# Patient Record
Sex: FEMALE | Race: WHITE | Employment: OTHER | URBAN - METROPOLITAN AREA
[De-identification: names, ages, dates, MRNs, and addresses within clinical notes are randomized per-mention and may not be internally consistent; named-entity substitution may affect disease eponyms.]

---

## 2019-10-31 ENCOUNTER — HOSPITAL ENCOUNTER (OUTPATIENT)
Dept: WOUND CARE | Age: 79
Discharge: HOME | End: 2019-10-31
Attending: SURGERY
Payer: MEDICARE

## 2019-10-31 VITALS
HEART RATE: 50 BPM | RESPIRATION RATE: 18 BRPM | BODY MASS INDEX: 36.8 KG/M2 | TEMPERATURE: 98.5 F | OXYGEN SATURATION: 99 % | DIASTOLIC BLOOD PRESSURE: 65 MMHG | WEIGHT: 200 LBS | HEIGHT: 62 IN | SYSTOLIC BLOOD PRESSURE: 166 MMHG

## 2019-10-31 DIAGNOSIS — E11.621 DIABETIC ULCER OF TOE OF RIGHT FOOT ASSOCIATED WITH TYPE 2 DIABETES MELLITUS, LIMITED TO BREAKDOWN OF SKIN (HCC): Primary | ICD-10-CM

## 2019-10-31 DIAGNOSIS — L97.511 DIABETIC ULCER OF TOE OF RIGHT FOOT ASSOCIATED WITH TYPE 2 DIABETES MELLITUS, LIMITED TO BREAKDOWN OF SKIN (HCC): Primary | ICD-10-CM

## 2019-10-31 PROCEDURE — 99213 OFFICE O/P EST LOW 20 MIN: CPT

## 2019-10-31 RX ORDER — INSULIN ASPART 100 [IU]/ML
7 INJECTION, SOLUTION INTRAVENOUS; SUBCUTANEOUS
COMMUNITY
End: 2020-02-18

## 2019-10-31 RX ORDER — DICLOFENAC SODIUM 10 MG/G
2 GEL TOPICAL 4 TIMES DAILY
COMMUNITY

## 2019-10-31 RX ORDER — NYSTATIN 100000 U/G
OINTMENT TOPICAL 2 TIMES DAILY
COMMUNITY
End: 2019-11-12

## 2019-10-31 RX ORDER — CLONAZEPAM 1 MG/1
TABLET ORAL DAILY
COMMUNITY

## 2019-10-31 RX ORDER — CARVEDILOL 25 MG/1
25 TABLET ORAL 2 TIMES DAILY WITH MEALS
COMMUNITY
End: 2019-11-12

## 2019-10-31 RX ORDER — FLUOXETINE HYDROCHLORIDE 20 MG/1
20 CAPSULE ORAL DAILY
COMMUNITY

## 2019-10-31 RX ORDER — TRAMADOL HYDROCHLORIDE 50 MG/1
50 TABLET ORAL
COMMUNITY

## 2019-10-31 RX ORDER — SOLIFENACIN SUCCINATE 10 MG/1
10 TABLET, FILM COATED ORAL DAILY
COMMUNITY

## 2019-10-31 RX ORDER — METFORMIN HYDROCHLORIDE 500 MG/1
500 TABLET ORAL 2 TIMES DAILY WITH MEALS
COMMUNITY
End: 2019-11-12

## 2019-10-31 RX ORDER — MEMANTINE HYDROCHLORIDE 14 MG/1
14 CAPSULE, EXTENDED RELEASE ORAL DAILY
COMMUNITY

## 2019-10-31 RX ORDER — ESOMEPRAZOLE MAGNESIUM 40 MG/1
40 CAPSULE, DELAYED RELEASE ORAL DAILY
COMMUNITY

## 2019-10-31 RX ORDER — FUROSEMIDE 40 MG/1
40 TABLET ORAL 2 TIMES DAILY
COMMUNITY

## 2019-10-31 RX ORDER — ACETAMINOPHEN 500 MG
2000 TABLET ORAL 2 TIMES DAILY
COMMUNITY
End: 2019-11-12

## 2019-10-31 RX ORDER — ASPIRIN 81 MG/1
81 TABLET ORAL DAILY
COMMUNITY

## 2019-10-31 RX ORDER — DOCUSATE SODIUM 100 MG/1
100 CAPSULE, LIQUID FILLED ORAL 2 TIMES DAILY
COMMUNITY
End: 2019-11-12

## 2019-10-31 RX ORDER — MELATONIN 5 MG
5 CAPSULE ORAL
COMMUNITY
End: 2019-11-12

## 2019-10-31 RX ORDER — POTASSIUM CHLORIDE 750 MG/1
10 TABLET, FILM COATED, EXTENDED RELEASE ORAL
COMMUNITY
End: 2019-11-12

## 2019-10-31 RX ORDER — CLOPIDOGREL BISULFATE 75 MG/1
75 TABLET ORAL DAILY
COMMUNITY

## 2019-10-31 RX ORDER — POLYETHYLENE GLYCOL 3350 17 G/17G
17 POWDER, FOR SOLUTION ORAL DAILY
COMMUNITY
End: 2019-11-12

## 2019-10-31 RX ORDER — ATORVASTATIN CALCIUM 40 MG/1
40 TABLET, FILM COATED ORAL DAILY
COMMUNITY

## 2019-10-31 RX ORDER — LISINOPRIL 5 MG/1
5 TABLET ORAL DAILY
COMMUNITY

## 2019-10-31 RX ORDER — OLANZAPINE 10 MG/1
10 TABLET ORAL
COMMUNITY

## 2019-10-31 RX ORDER — DONEPEZIL HYDROCHLORIDE 5 MG/1
5 TABLET, FILM COATED ORAL
COMMUNITY

## 2019-10-31 RX ORDER — LEVOTHYROXINE SODIUM 100 UG/1
125 TABLET ORAL
COMMUNITY

## 2019-10-31 RX ORDER — VENLAFAXINE 75 MG/1
75 TABLET ORAL 3 TIMES DAILY
COMMUNITY
End: 2020-02-18

## 2019-10-31 RX ORDER — ACETAMINOPHEN 500 MG
500 TABLET ORAL
COMMUNITY
End: 2019-11-12

## 2019-10-31 RX ORDER — LANOLIN ALCOHOL/MO/W.PET/CERES
400 CREAM (GRAM) TOPICAL DAILY
COMMUNITY

## 2019-10-31 RX ORDER — INSULIN GLARGINE 100 [IU]/ML
55 INJECTION, SOLUTION SUBCUTANEOUS
COMMUNITY

## 2019-10-31 NOTE — WOUND CARE
Clinic Level of Care Assessment NAME:  Riky Bradley YOB: 1940 GENDER: female MEDICAL RECORD NUMBER:  [id number] DATE:  10/31/2019 Wound Count Document in [facility] Number of Wounds Assessed Points No Wounds/Ulcers []   0 Less than Three Wounds/Ulcers [x]   1  
3-6 Wounds/Ulcers []   2 Greater than 6 Wounds/Ulcers []   3 Ambulation Status Document in Coord/HIGINIO/Mobility tab Status Definition Points Independent Independently able to ambulate. Fully able (without any assistance) to get on/off exam table/chair. []   0 Minimal Physical Assistance Requires physical assistance of one person to ambulate and/or position patient to be examined. Includes necessary physical assistance to position lower extremities on/off stool. [x]   1 Moderate Physical Assistance Requires at least one staff member to physically assist patient in ambulating into treatment room, and on/off exam table. []   2 Full Assistance Requires assistance of at least two staff members to transfer patient into treatment room and/or on/off exam table/chair. \"Total Transfer\". []   3 Dressing Complexity Document in [facility] and Write Appropriate Order Complexity Definition Points No Dressing  []   0 Simple Minimal, simple dressing. i.e. Band-aid, gauze, simple wrap. [x]   1 Intermediate Moderately complicated requiring licensed personnel to apply i.e. collagen matrix, ointments, gels, alginates. []   2 Complex Complicated requiring licensed personnel to apply dressings 6 or more wounds. []   3 Teaching Effort Document in Education Tab Effort Definition Points No Teaching  []   0 Simple Reinforce two or less topics. Document in Education navigator.  []   1 Intermediate Reinforce three to five topics and/or one additional  
new topic. Document in Education navigator. [x]   2 Complex Teach more than one new topic. New patient information  
packet reviewed and/or reinforce more than three topics. Document in Education navigator. HBO initial instruction. []   3 Patient Assessment and Planning Planning Definition Points Simple Multiple System Simple: Simple follow-up with routine assessment and planning. If Discharged, instructions and long term/follow-up care given to patient/caregiver. Discharged, instructions and/or After Visit Summary given to patient/caregiver and instructions completed. []   1 Intermediate Multiple System Intermediate: Contact with outside resources; i.e. Telephone calls to home health, [facility]. May include filling out forms and writing letters, arranging transportation, communication with insurance , vendors, etc.  Discharged, instructions and/or After Visit Summary given to patient/caregiver and instructions completed. [x]   2 Complex Multiple System Complex: Full, comprehensive assessment and planning. Follow the entire navigator under Wound Visit charting filling out each tab which includes OP Adm Database Screening, Education and CarePlan  HBO risk assessment completed. Discharged, instructions and/or After Visit Summary given to patient/caregiver and instructions completed. []   3 Is this the Patient's First Visit to the [address] [place] [address] Road Yes Is this Patient Established @ [facility] 
No 
 
         Clinical Level of Care Points  0-2  Level 1 [] Points  3-5  Level 2 [] Points  6-9  Level 3 [x] Points  10-12  Level 4 [] Points  13-15  Level 5 [] Electronically signed by Rupal Lloyd RN on 10/31/2019 at 2:53 PM

## 2019-10-31 NOTE — DISCHARGE INSTRUCTIONS
Right Great Toe Wound:  Cleanse wound with normal saline. DO NOT GET WET IN SHOWER, WEAR CAST COVER IN SHOWER! Apply Betadine to great toe, cover with rolled gauze. Change daily. Right Heel Wound:  Cleanse wound with normal saline. DO NOT GET WET IN SHOWER, WEAR CAST COVER IN SHOWER! Apply Betadine to right heel, cover with rolled gauze. Change daily. Elevate heels off of bed, do not allow pressure to be put on heels. *Hemoglobin A1C lab ordered to be done at facility, please bring result to next appointment. *Xray of Right Foot to be done in facility and results brought to next appointment. *NIA study of blood flow ordered to be done by Vascular at [facility], Vascular office will call to schedule appointment. *Please order offloading heelift boots, wear while sitting laying down. DO NOT WALK IN THEM!

## 2019-10-31 NOTE — WOUND CARE
[address] [place] [address], [address] [address] Phone: [phone] Fax: [phone] Patient: Merissa Johnson MRN: [id number]  SSN: [id number] YOB: 1940  Age: 67 y.o. Sex: female Return Appointment: 1 week with Latia Almodovar MD 
 
Instructions:  
Right Great Toe Wound: 
Cleanse wound with normal saline. DO NOT GET WET IN SHOWER, WEAR CAST COVER IN SHOWER! Apply Betadine to great toe, cover with rolled gauze. Change daily. Right Heel Wound: 
Cleanse wound with normal saline. DO NOT GET WET IN SHOWER, WEAR CAST COVER IN SHOWER! Apply Betadine to right heel, cover with rolled gauze. Change daily. Elevate heels off of bed, do not allow pressure to be put on heels. *Hemoglobin A1C lab ordered to be done at facility, please bring result to next appointment. *Xray of Right Foot to be done in facility and results brought to next appointment. *NIA study of blood flow ordered to be done by Vascular at [facility], Vascular office will call to schedule appointment. *Please order offloading heelift boots, wear while sitting laying down. DO NOT WALK IN THEM! Should you experience increased redness, swelling, pain, foul odor, size of wound(s), or have a temperature over 101 degrees please contact the [address] [place] [address] Road at [phone] or if after hours contact your primary care physician or go to the hospital emergency department. Signed By: Susi Acosta RN October 31, 2019

## 2019-10-31 NOTE — WOUND CARE
10/31/19 1434 Wound Toe (Comment  which one) Right Date First Assessed/Time First Assessed: 10/31/19 1433   Present on Hospital Admission: Yes  Primary Wound Type: Diabetic Ulcer  Location: (c) Toe (Comment  which one)  Wound Location Orientation: Right Dressing Status Old drainage Dressing Type  
(bordered foam) Non-staged Wound Description Full thickness Wound Length (cm) 0.7 cm Wound Width (cm) 1.5 cm Wound Depth (cm) 0.1 cm Wound Surface Area (cm^2) 1.05 cm^2 Wound Volume (cm^3) 0.1 cm^3 Condition of Base Eschar Tissue Type Percent Black 100 % Drainage Amount Small Drainage Color Serosanguinous Wound Odor None Carmencita-wound Assessment Dry Cleansing and Cleansing Agents  Normal saline Dressing Changed Changed/New Wound Heel Right Date First Assessed/Time First Assessed: 10/31/19 1434   Present on Hospital Admission: Yes  Primary Wound Type: Diabetic Ulcer  Location: Heel  Wound Location Orientation: Right Dressing Status Old drainage Dressing Type  
(medi-honey, duoderm, rolled gauze) Non-staged Wound Description Full thickness Wound Length (cm) 4 cm Wound Width (cm) 3 cm Wound Depth (cm) 0.1 cm Wound Surface Area (cm^2) 12 cm^2 Wound Volume (cm^3) 1.2 cm^3 Condition of Base Eschar Tissue Type Percent Black 100 % Drainage Amount Small Drainage Color Serosanguinous Cleansing and Cleansing Agents  Normal saline Dressing Changed Changed/New Patient Is taking Plavix and Aspirin daily.

## 2019-11-12 ENCOUNTER — HOSPITAL ENCOUNTER (OUTPATIENT)
Dept: WOUND CARE | Age: 79
Discharge: HOME | End: 2019-11-12
Attending: SURGERY
Payer: MEDICARE

## 2019-11-12 VITALS
DIASTOLIC BLOOD PRESSURE: 65 MMHG | OXYGEN SATURATION: 98 % | TEMPERATURE: 98.3 F | RESPIRATION RATE: 18 BRPM | SYSTOLIC BLOOD PRESSURE: 168 MMHG | HEART RATE: 50 BPM

## 2019-11-12 PROCEDURE — 99213 OFFICE O/P EST LOW 20 MIN: CPT

## 2019-11-12 NOTE — PROGRESS NOTES
Patient returns today and has gotten good healing of her toe and partial healing of the heel this is debrided slightly in the day and is redressed with Betadine she will return in 3 weeks. Wound Center Progress Note    Patient: Zohra Ann MRN: [id number]  SSN: [id number]    YOB: 1940  Age: 67 y.o. Sex: female      Subjective:     Chief Complaint:  Zohra Ann is a 67 y.o. WHITE OR  female who presents with toes right, great toe, R heel wound of 3 weeks duration. History of Present Illness:     See above note  Wound Caused By: chronic pressure/irritation due to bad position  Associated Signs and Symptoms: None  Timing: Intermittent  Quality: Cramping  Severity: 2/10  Modifying Factors: Inactivity  Current Wound Care: See nurse's notes  Past Medical History:   Diagnosis Date    Arthritis     [facility]     Chronic pain     [facility]     Diabetes ([facility]     GERD (gastroesophageal reflux disease)     Hypertension     Obstructive sleep apnea     Pulmonary hypertension ([facility]     Stroke ([facility]     Thyroid disease     Hypothyroidism      Past Surgical History:   Procedure Laterality Date    HX BILATERAL SALPINGO-OOPHORECTOMY      HX CHOLECYSTECTOMY      HX GYN      total abdominal hysterectomy     History reviewed. No pertinent family history. Social History     Tobacco Use    Smoking status: Former Smoker     Packs/day: 2.00     Years: 4.00     Pack years: 8.00     Last attempt to quit:      Years since quittin.8    Smokeless tobacco: Never Used   Substance Use Topics    Alcohol use: Not Currently       Prior to Admission medications    Medication Sig Start Date End Date Taking? Authorizing Provider   esomeprazole (NEXIUM) 40 mg capsule Take 40 mg by mouth daily. Provider, Historical   clopidogrel (PLAVIX) 75 mg tab Take 75 mg by mouth daily.     Provider, Historical   furosemide (LASIX) 40 mg tablet Take 40 mg by mouth daily. Provider, Historical   clonazePAM (KLONOPIN) 1 mg tablet Take 0.5 mg by mouth two (2) times a day. Provider, Historical   levothyroxine (SYNTHROID) 100 mcg tablet Take 100 mcg by mouth Daily (before breakfast). Provider, Historical   venlafaxine (EFFEXOR) 75 mg tablet Take 75 mg by mouth three (3) times daily. Provider, Historical   traMADol (ULTRAM) 50 mg tablet Take 50 mg by mouth every six (6) hours as needed for Pain. Provider, Historical   aspirin delayed-release 81 mg tablet Take 81 mg by mouth daily. Provider, Historical   diclofenac (VOLTAREN) 1 % gel Apply 2 g to affected area four (4) times daily. Provider, Historical   donepezil (ARICEPT) 5 mg tablet Take 5 mg by mouth nightly. Provider, Historical   FLUoxetine (PROZAC) 20 mg capsule Take 20 mg by mouth daily. Provider, Historical   magnesium oxide (MAG-OX) 400 mg tablet Take 400 mg by mouth daily. Provider, Historical   insulin aspart U-100 (NOVOLOG) 100 unit/mL injection 7 Units by SubCUTAneous route Before breakfast, lunch, and dinner. Provider, Historical   OLANZapine (ZYPREXA) 10 mg tablet Take 10 mg by mouth nightly. Provider, Historical   solifenacin (VESICARE) 10 mg tablet Take 10 mg by mouth daily. Provider, Historical   atorvastatin (LIPITOR) 40 mg tablet Take 40 mg by mouth daily. Provider, Historical   Ferrous Fumarate 325 mg (106 mg iron) tab Take 325 mg by mouth. Provider, Historical   insulin glargine (LANTUS U-100 INSULIN) 100 unit/mL injection 50 Units by SubCUTAneous route nightly. Provider, Historical   lisinopril (PRINIVIL, ZESTRIL) 5 mg tablet Take 5 mg by mouth daily. Provider, Historical   memantine ER (NAMENDA XR) 14 mg capsule Take 14 mg by mouth daily. Provider, Historical   insulin NPH/insulin regular (NOVOLIN 70/30, HUMULIN 70/30) 100 unit/mL (70-30) injection 75 Units by SubCUTAneous route two (2) times a day.     Provider, Historical     Allergies Allergen Reactions    Propoxyphene N-Acetaminophen Hives    Sulfa (Sulfonamide Antibiotics) Hives        Review of Systems:  A comprehensive review of systems was negative except for that written in the History of Present Illness. No results found for: HBA1C, LPH2HVUG, HGBE8, ZUW7PRFX, OHW1IAQA       There is no immunization history on file for this patient. There is no height or weight on file to calculate BMI. Counseling regarding nutrition done: No     Current medications:  Current Outpatient Medications   Medication Sig Dispense Refill    esomeprazole (NEXIUM) 40 mg capsule Take 40 mg by mouth daily.  clopidogrel (PLAVIX) 75 mg tab Take 75 mg by mouth daily.  furosemide (LASIX) 40 mg tablet Take 40 mg by mouth daily.  clonazePAM (KLONOPIN) 1 mg tablet Take 0.5 mg by mouth two (2) times a day.  levothyroxine (SYNTHROID) 100 mcg tablet Take 100 mcg by mouth Daily (before breakfast).  venlafaxine (EFFEXOR) 75 mg tablet Take 75 mg by mouth three (3) times daily.  traMADol (ULTRAM) 50 mg tablet Take 50 mg by mouth every six (6) hours as needed for Pain.  aspirin delayed-release 81 mg tablet Take 81 mg by mouth daily.  diclofenac (VOLTAREN) 1 % gel Apply 2 g to affected area four (4) times daily.  donepezil (ARICEPT) 5 mg tablet Take 5 mg by mouth nightly.  FLUoxetine (PROZAC) 20 mg capsule Take 20 mg by mouth daily.  magnesium oxide (MAG-OX) 400 mg tablet Take 400 mg by mouth daily.  insulin aspart U-100 (NOVOLOG) 100 unit/mL injection 7 Units by SubCUTAneous route Before breakfast, lunch, and dinner.  OLANZapine (ZYPREXA) 10 mg tablet Take 10 mg by mouth nightly.  solifenacin (VESICARE) 10 mg tablet Take 10 mg by mouth daily.  atorvastatin (LIPITOR) 40 mg tablet Take 40 mg by mouth daily.  Ferrous Fumarate 325 mg (106 mg iron) tab Take 325 mg by mouth.       insulin glargine (LANTUS U-100 INSULIN) 100 unit/mL injection 50 Units by SubCUTAneous route nightly.  lisinopril (PRINIVIL, ZESTRIL) 5 mg tablet Take 5 mg by mouth daily.  memantine ER (NAMENDA XR) 14 mg capsule Take 14 mg by mouth daily.  insulin NPH/insulin regular (NOVOLIN 70/30, HUMULIN 70/30) 100 unit/mL (70-30) injection 75 Units by SubCUTAneous route two (2) times a day. Objective:     Physical Exam:     Visit Vitals  /65 (BP 1 Location: Right arm)   Pulse (!) 50   Temp 98.3 °F (36.8 °C)   Resp 18   SpO2 98%       General: well developed, well nourished, pleasant , NAD. Hygiene good  Psych: cooperative. Pleasant. No anxiety or depression. Normal mood and affect. Neuro: alert and oriented to person/place/situation. Otherwise nonfocal.  Derm: Normal turgor for age, dry skin  HEENT: Normocephalic, atraumatic. EOMI. Conjunctiva clear. No scleral icterus. Neck: Normal range of motion. No masses. Chest: Good air entry bilaterally. Respirations nonlabored  Cardio: Normal heart sounds,no rubs, murmurs or gallops  Abdomen: Soft, nontender, nondistended, normoactive bowel sounds  Lower extremities: color normal; temperature normal. Hair growth is not present. Calves are supple, nontender, approximately equally sized in comparison.  Capillary refill <3 sec            Ulcer Description:   Wound Toe (Comment  which one) Right (Active)   Dressing Status Clean 11/12/2019  1:20 PM   Non-staged Wound Description Full thickness 10/31/2019  2:34 PM   Wound Length (cm) 0 cm 11/12/2019  1:20 PM   Wound Width (cm) 0 cm 11/12/2019  1:20 PM   Wound Depth (cm) 0 cm 11/12/2019  1:20 PM   Wound Surface Area (cm^2) 0 cm^2 11/12/2019  1:20 PM   Wound Volume (cm^3) 0 cm^3 11/12/2019  1:20 PM   Condition of Base Epithelializing 11/12/2019  1:20 PM   Epithelialization (%) 100 11/12/2019  1:20 PM   Tissue Type Percent Black 100 % 10/31/2019  2:34 PM   Drainage Amount None 11/12/2019  1:20 PM   Drainage Color Serosanguinous 10/31/2019  2:34 PM   Wound Odor None 11/12/2019 1:20 PM   Carmencita-wound Assessment Dry 10/31/2019  2:34 PM   Cleansing and Cleansing Agents  Normal saline 11/12/2019  1:20 PM   Dressing Changed Changed/New 10/31/2019  2:34 PM   Number of days: 12       Wound Heel Right (Active)   Dressing Status Clean, dry, and intact 11/12/2019  1:20 PM   Non-staged Wound Description Full thickness 11/12/2019  1:20 PM   Wound Length (cm) 3.5 cm 11/12/2019  1:20 PM   Wound Width (cm) 3 cm 11/12/2019  1:20 PM   Wound Depth (cm) 0.1 cm 11/12/2019  1:20 PM   Wound Surface Area (cm^2) 10.5 cm^2 11/12/2019  1:20 PM   Wound Volume (cm^3) 1.05 cm^3 11/12/2019  1:20 PM   Condition of Base Eschar 11/12/2019  1:20 PM   Tissue Type Percent Black 100 % 11/12/2019  1:20 PM   Drainage Amount None 11/12/2019  1:20 PM   Drainage Color Serosanguinous 10/31/2019  2:34 PM   Wound Odor None 11/12/2019  1:20 PM   Cleansing and Cleansing Agents  Normal saline 11/12/2019  1:20 PM   Dressing Changed Changed/New 11/12/2019  1:20 PM   Number of days: 12         Data Review:   No results found for this or any previous visit (from the past 24 hour(s)). Assessment:     67 y.o. female with R heel combined ulcer. Plan:     Orders Placed This Encounter    WOUND CARE, DRESSING CHANGE        Right Heel Wound:  Cleanse wound with normal saline. DO NOT GET WET IN SHOWER, WEAR CAST COVER IN SHOWER! Apply Betadine to right heel, cover with rolled gauze. Change daily. Elevate heels off of bed, do not allow pressure to be put on heels.      *Hemoglobin A1C lab ordered to be done at facility, please bring result to next appointment. *Xray of Right Foot to be done in facility and results brought to next appointment. *Please order offloading heelift boots, wear while sitting laying down. DO NOT WALK IN THEM! Standing Status:   Standing     Number of Occurrences:   1        Patient understood and agrees with plan. Questions answered.     Follow-up Information     Follow up With Specialties Details Why Contact Info    [address] In 3 weeks  [address] [place] [address] [id number]  [phone]             Any procedures done today in the [address] are documented in a separate note in [facility] and made part of this record by reference.      Dictated using voice recognition software; proofread, but unrecognized errors may exist.    Signed By: Delphine Irving MD     November 12, 2019

## 2019-11-12 NOTE — WOUND CARE
[address] [place] [address], [address] Phone: [phone] Fax: [phone] Patient: Pankaj Leal MRN: [id number]  SSN: [id number] YOB: 1940  Age: 67 y.o. Sex: female Return Appointment: 3 weeks with Yusra Carballo MD 
 
Instructions:   
Right Heel Wound: 
Cleanse wound with normal saline. DO NOT GET WET IN SHOWER, WEAR CAST COVER IN SHOWER! Apply Betadine to right heel, cover with rolled gauze. Change daily. Elevate heels off of bed, do not allow pressure to be put on heels.  
  
*Hemoglobin A1C lab ordered to be done at facility, please bring result to next appointment. *Xray of Right Foot to be done in facility and results brought to next appointment. *Please order offloading heelift boots, wear while sitting laying down. DO NOT WALK IN THEM! Should you experience increased redness, swelling, pain, foul odor, size of wound(s), or have a temperature over 101 degrees please contact the [address] [place] [address] Road at [phone] or if after hours contact your primary care physician or go to the hospital emergency department. Signed By: Kota Banuelos RN November 12, 2019

## 2019-11-12 NOTE — WOUND CARE
11/12/19 1320 Wound Toe (Comment  which one) Right Date First Assessed/Time First Assessed: 10/31/19 1433   Present on Hospital Admission: Yes  Primary Wound Type: Diabetic Ulcer  Location: (c) Toe (Comment  which one)  Wound Location Orientation: Right Dressing Status Clean Dressing Type  
(betadine) Wound Length (cm) 0 cm Wound Width (cm) 0 cm Wound Depth (cm) 0 cm Wound Surface Area (cm^2) 0 cm^2 Wound Volume (cm^3) 0 cm^3 Condition of Base Epithelializing Epithelialization (%) 100 Drainage Amount None Wound Odor None Cleansing and Cleansing Agents  Normal saline Wound Heel Right Date First Assessed/Time First Assessed: 10/31/19 1434   Present on Hospital Admission: Yes  Primary Wound Type: Diabetic Ulcer  Location: Heel  Wound Location Orientation: Right Dressing Status Clean, dry, and intact Dressing Type  
(betadine, rolled gauze) Wound Length (cm) 3.5 cm Wound Width (cm) 3 cm Wound Depth (cm) 0.1 cm Wound Surface Area (cm^2) 10.5 cm^2 Wound Volume (cm^3) 1.05 cm^3 Condition of Base Eschar Tissue Type Percent Black 100 % Drainage Amount None Wound Odor None Cleansing and Cleansing Agents  Normal saline Patient is taking Plavix and Aspirin daily

## 2019-11-12 NOTE — WOUND CARE
Clinic Level of Care Assessment NAME:  Soledad Mooney YOB: 1940 GENDER: female MEDICAL RECORD NUMBER:  [id number] DATE:  11/12/2019 Wound Count Document in [facility] Number of Wounds Assessed Points No Wounds/Ulcers []   0 Less than Three Wounds/Ulcers [x]   1  
3-6 Wounds/Ulcers []   2 Greater than 6 Wounds/Ulcers []   3 Ambulation Status Document in Coord/HIGINIO/Mobility tab Status Definition Points Independent Independently able to ambulate. Fully able (without any assistance) to get on/off exam table/chair. []   0 Minimal Physical Assistance Requires physical assistance of one person to ambulate and/or position patient to be examined. Includes necessary physical assistance to position lower extremities on/off stool. []   1 Moderate Physical Assistance Requires at least one staff member to physically assist patient in ambulating into treatment room, and on/off exam table. [x]   2 Full Assistance Requires assistance of at least two staff members to transfer patient into treatment room and/or on/off exam table/chair. \"Total Transfer\". []   3 Dressing Complexity Document in [facility] and Write Appropriate Order Complexity Definition Points No Dressing  []   0 Simple Minimal, simple dressing. i.e. Band-aid, gauze, simple wrap. [x]   1 Intermediate Moderately complicated requiring licensed personnel to apply i.e. collagen matrix, ointments, gels, alginates. []   2 Complex Complicated requiring licensed personnel to apply dressings 6 or more wounds. []   3 Teaching Effort Document in Education Tab Effort Definition Points No Teaching  []   0 Simple Reinforce two or less topics. Document in Education navigator.  []   1 Intermediate Reinforce three to five topics and/or one additional  
new topic. Document in Education navigator. [x]   2 Complex Teach more than one new topic. New patient information  
packet reviewed and/or reinforce more than three topics. Document in Education navigator. HBO initial instruction. []   3 Patient Assessment and Planning Planning Definition Points Simple Multiple System Simple: Simple follow-up with routine assessment and planning. If Discharged, instructions and long term/follow-up care given to patient/caregiver. Discharged, instructions and/or After Visit Summary given to patient/caregiver and instructions completed. []   1 Intermediate Multiple System Intermediate: Contact with outside resources; i.e. Telephone calls to home health, [facility]. May include filling out forms and writing letters, arranging transportation, communication with insurance , vendors, etc.  Discharged, instructions and/or After Visit Summary given to patient/caregiver and instructions completed. [x]   2 Complex Multiple System Complex: Full, comprehensive assessment and planning. Follow the entire navigator under Wound Visit charting filling out each tab which includes OP Adm Database Screening, Education and CarePlan  HBO risk assessment completed. Discharged, instructions and/or After Visit Summary given to patient/caregiver and instructions completed. []   3 Is this the Patient's First Visit to the [address] [place] [address] Road No 
 
 
Is this Patient Established @ [facility] 
Yes Clinical Level of Care Points  0-2  Level 1 [] Points  3-5  Level 2 [] Points  6-9  Level 3 [x] Points  10-12  Level 4 [] Points  13-15  Level 5 [] Electronically signed by Ashwin Raymond RN on 11/12/2019 at 3:41 PM

## 2019-12-03 ENCOUNTER — HOSPITAL ENCOUNTER (OUTPATIENT)
Dept: WOUND CARE | Age: 79
Discharge: HOME | End: 2019-12-03
Attending: SURGERY
Payer: MEDICARE

## 2019-12-03 VITALS — RESPIRATION RATE: 18 BRPM | DIASTOLIC BLOOD PRESSURE: 72 MMHG | SYSTOLIC BLOOD PRESSURE: 165 MMHG | TEMPERATURE: 98.2 F

## 2019-12-03 PROCEDURE — 99213 OFFICE O/P EST LOW 20 MIN: CPT

## 2019-12-03 NOTE — WOUND CARE
12/03/19 1318 Wound Heel Right Date First Assessed/Time First Assessed: 10/31/19 1434   Present on Hospital Admission: Yes  Primary Wound Type: Diabetic Ulcer  Location: Heel  Wound Location Orientation: Right Dressing Status Clean, dry, and intact Dressing Type  
(betadine, abd,rolled gauze) Non-staged Wound Description Full thickness Wound Length (cm) 3.5 cm Wound Width (cm) 1.8 cm Wound Depth (cm) 0.1 cm Wound Surface Area (cm^2) 6.3 cm^2 Wound Volume (cm^3) 0.63 cm^3 Condition of Base Eschar Tissue Type Percent Black 100 % Drainage Amount None Wound Odor None Cleansing and Cleansing Agents  Normal saline Patient is taking Aspirin and Plavix daily

## 2019-12-03 NOTE — WOUND CARE
Clinic Level of Care Assessment NAME:  Soledad Mooney YOB: 1940 GENDER: female MEDICAL RECORD NUMBER:  [id number] DATE:  12/3/2019 Wound Count Document in [facility] Number of Wounds Assessed Points No Wounds/Ulcers []   0 Less than Three Wounds/Ulcers [x]   1  
3-6 Wounds/Ulcers []   2 Greater than 6 Wounds/Ulcers []   3 Ambulation Status Document in Coord/HIGINIO/Mobility tab Status Definition Points Independent Independently able to ambulate. Fully able (without any assistance) to get on/off exam table/chair. []   0 Minimal Physical Assistance Requires physical assistance of one person to ambulate and/or position patient to be examined. Includes necessary physical assistance to position lower extremities on/off stool. []   1 Moderate Physical Assistance Requires at least one staff member to physically assist patient in ambulating into treatment room, and on/off exam table. [x]   2 Full Assistance Requires assistance of at least two staff members to transfer patient into treatment room and/or on/off exam table/chair. \"Total Transfer\". []   3 Dressing Complexity Document in [facility] and Write Appropriate Order Complexity Definition Points No Dressing  []   0 Simple Minimal, simple dressing. i.e. Band-aid, gauze, simple wrap. []   1 Intermediate Moderately complicated requiring licensed personnel to apply i.e. collagen matrix, ointments, gels, alginates. [x]   2 Complex Complicated requiring licensed personnel to apply dressings 6 or more wounds. []   3 Teaching Effort Document in Education Tab Effort Definition Points No Teaching  []   0 Simple Reinforce two or less topics. Document in Education navigator. [x]   1 Intermediate Reinforce three to five topics and/or one additional  
new topic. Document in Education navigator. []   2 Complex Teach more than one new topic. New patient information  
packet reviewed and/or reinforce more than three topics. Document in Education navigator. HBO initial instruction. []   3 Patient Assessment and Planning Planning Definition Points Simple Multiple System Simple: Simple follow-up with routine assessment and planning. If Discharged, instructions and long term/follow-up care given to patient/caregiver. Discharged, instructions and/or After Visit Summary given to patient/caregiver and instructions completed. []   1 Intermediate Multiple System Intermediate: Contact with outside resources; i.e. [place] calls to [place] health, [facility] May include filling out forms and writing letters, arranging transportation, communication with insurance , vendors, etc.  Discharged, instructions and/or After Visit Summary given to patient/caregiver and instructions completed. [x]   2 Complex Multiple System Complex: Full, comprehensive assessment and planning. Follow the entire navigator under Wound Visit charting filling out each tab which includes OP Adm Database Screening, Education and CarePlan  HBO risk assessment completed. Discharged, instructions and/or After Visit Summary given to patient/caregiver and instructions completed. []   3 Is this the Patient's First Visit to the [facility] [address] No 
 
 
Is this Patient Established @ [facility] 
Yes Clinical Level of Care Points  0-2  Level 1 [] Points  3-5  Level 2 [] Points  6-9  Level 3 [x] Points  10-12  Level 4 [] Points  13-15  Level 5 [] Electronically signed by Isabel Colby RN on 12/3/2019 at 3:04 PM

## 2019-12-03 NOTE — PROGRESS NOTES
Wound Center Progress Note    Patient: Padmini Brown MRN: [id number]  SSN: [id number]    YOB: 1940  Age: 67 y.o. Sex: female      Subjective:     Chief Complaint:  Padmini Brown is a 67 y.o. WHITE OR  female who presents with R heel wound of 1 month duration. The wound looks much better and the eschar is gradually . She has a small blister on her right great toe which we will watch by seeing her again in 2 weeks. In the meantime we will continue painting these areas with Betadine. History of Present Illness:       Wound Caused By: chronic pressure/irritation due to bedridden state  Associated Signs and Symptoms: Mild pain  Timing: Intermittent  Quality: Aching  Severity: 2/10  Modifying Factors: Age and inability to move  Current Wound Care: See nurse's notes    Past Medical History:   Diagnosis Date    Arthritis     [facility] ([facility]A) ([facility]     Chronic pain     [facility]est[facility] (CHF) ([facility]     Diabetes ([facility]     GERD (gastroesophageal reflux disease)     Hypertension     Obstructive sleep apnea     Pulmonary hypertension ([facility]     Stroke ([facility]     Thyroid disease     Hypothyroidism      Past Surgical History:   Procedure Laterality Date    HX BILATERAL SALPINGO-OOPHORECTOMY      HX CHOLECYSTECTOMY      HX GYN      total abdominal hysterectomy     History reviewed. No pertinent family history. Social History     Tobacco Use    Smoking status: Former Smoker     Packs/day: 2.00     Years: 4.00     Pack years: 8.00     Last attempt to quit:      Years since quittin.9    Smokeless tobacco: Never Used   Substance Use Topics    Alcohol use: Not Currently       Prior to Admission medications    Medication Sig Start Date End Date Taking? Authorizing Provider   esomeprazole (NEXIUM) 40 mg capsule Take 40 mg by mouth daily. Provider, Historical   clopidogrel (PLAVIX) 75 mg tab Take 75 mg by mouth daily.     Provider, Historical furosemide (LASIX) 40 mg tablet Take 40 mg by mouth daily. Provider, Historical   clonazePAM (KLONOPIN) 1 mg tablet Take 0.5 mg by mouth two (2) times a day. Provider, Historical   levothyroxine (SYNTHROID) 100 mcg tablet Take 100 mcg by mouth Daily (before breakfast). Provider, Historical   venlafaxine (EFFEXOR) 75 mg tablet Take 75 mg by mouth three (3) times daily. Provider, Historical   traMADol (ULTRAM) 50 mg tablet Take 50 mg by mouth every six (6) hours as needed for Pain. Provider, Historical   aspirin delayed-release 81 mg tablet Take 81 mg by mouth daily. Provider, Historical   diclofenac (VOLTAREN) 1 % gel Apply 2 g to affected area four (4) times daily. Provider, Historical   donepezil (ARICEPT) 5 mg tablet Take 5 mg by mouth nightly. Provider, Historical   FLUoxetine (PROZAC) 20 mg capsule Take 20 mg by mouth daily. Provider, Historical   magnesium oxide (MAG-OX) 400 mg tablet Take 400 mg by mouth daily. Provider, Historical   insulin aspart U-100 (NOVOLOG) 100 unit/mL injection 7 Units by SubCUTAneous route Before breakfast, lunch, and dinner. Provider, Historical   OLANZapine (ZYPREXA) 10 mg tablet Take 10 mg by mouth nightly. Provider, Historical   solifenacin (VESICARE) 10 mg tablet Take 10 mg by mouth daily. Provider, Historical   atorvastatin (LIPITOR) 40 mg tablet Take 40 mg by mouth daily. Provider, Historical   Ferrous Fumarate 325 mg (106 mg iron) tab Take 325 mg by mouth. Provider, Historical   insulin glargine (LANTUS U-100 INSULIN) 100 unit/mL injection 50 Units by SubCUTAneous route nightly. Provider, Historical   lisinopril (PRINIVIL, ZESTRIL) 5 mg tablet Take 5 mg by mouth daily. Provider, Historical   memantine ER (NAMENDA XR) 14 mg capsule Take 14 mg by mouth daily. Provider, Historical   insulin NPH/insulin regular (NOVOLIN 70/30, HUMULIN 70/30) 100 unit/mL (70-30) injection 75 Units by SubCUTAneous route two (2) times a day. Provider, Historical     Allergies   Allergen Reactions    Propoxyphene N-Acetaminophen Hives    Sulfa (Sulfonamide Antibiotics) Hives        Review of Systems:  A comprehensive review of systems was negative except for that written in the History of Present Illness. No results found for: HBA1C, CXF5AMBF, HGBE8, TOT9MDOO, WKV6UKHH       There is no immunization history on file for this patient. There is no height or weight on file to calculate BMI. Counseling regarding nutrition done: No     Current medications:  Current Outpatient Medications   Medication Sig Dispense Refill    esomeprazole (NEXIUM) 40 mg capsule Take 40 mg by mouth daily.  clopidogrel (PLAVIX) 75 mg tab Take 75 mg by mouth daily.  furosemide (LASIX) 40 mg tablet Take 40 mg by mouth daily.  clonazePAM (KLONOPIN) 1 mg tablet Take 0.5 mg by mouth two (2) times a day.  levothyroxine (SYNTHROID) 100 mcg tablet Take 100 mcg by mouth Daily (before breakfast).  venlafaxine (EFFEXOR) 75 mg tablet Take 75 mg by mouth three (3) times daily.  traMADol (ULTRAM) 50 mg tablet Take 50 mg by mouth every six (6) hours as needed for Pain.  aspirin delayed-release 81 mg tablet Take 81 mg by mouth daily.  diclofenac (VOLTAREN) 1 % gel Apply 2 g to affected area four (4) times daily.  donepezil (ARICEPT) 5 mg tablet Take 5 mg by mouth nightly.  FLUoxetine (PROZAC) 20 mg capsule Take 20 mg by mouth daily.  magnesium oxide (MAG-OX) 400 mg tablet Take 400 mg by mouth daily.  insulin aspart U-100 (NOVOLOG) 100 unit/mL injection 7 Units by SubCUTAneous route Before breakfast, lunch, and dinner.  OLANZapine (ZYPREXA) 10 mg tablet Take 10 mg by mouth nightly.  solifenacin (VESICARE) 10 mg tablet Take 10 mg by mouth daily.  atorvastatin (LIPITOR) 40 mg tablet Take 40 mg by mouth daily.  Ferrous Fumarate 325 mg (106 mg iron) tab Take 325 mg by mouth.       insulin glargine (LANTUS U-100 INSULIN) 100 unit/mL injection 50 Units by SubCUTAneous route nightly.  lisinopril (PRINIVIL, ZESTRIL) 5 mg tablet Take 5 mg by mouth daily.  memantine ER (NAMENDA XR) 14 mg capsule Take 14 mg by mouth daily.  insulin NPH/insulin regular (NOVOLIN 70/30, HUMULIN 70/30) 100 unit/mL (70-30) injection 75 Units by SubCUTAneous route two (2) times a day. Objective:     Physical Exam:     Visit Vitals  /72 (BP 1 Location: Right arm)   Temp 98.2 °F (36.8 °C)   Resp 18       General: well developed, well nourished, pleasant , NAD. Hygiene good  Psych: cooperative. Pleasant. No anxiety or depression. Normal mood and affect. Neuro: alert and oriented to person/place/situation. Otherwise nonfocal.  Derm: Normal turgor for age, dry skin  HEENT: Normocephalic, atraumatic. EOMI. Conjunctiva clear. No scleral icterus. Neck: Normal range of motion. No masses. Chest: Good air entry bilaterally. Respirations nonlabored  Cardio: Normal heart sounds,no rubs, murmurs or gallops  Abdomen: Soft, nontender, nondistended, normoactive bowel sounds  Lower extremities: color normal; temperature normal. Hair growth is not present. Calves are supple, nontender, approximately equally sized in comparison.  Capillary refill <3 sec      Diabetic Foot Ulcer/Neuropathic   Is Wound Greater than 1.0 sq cm ? : Yes  Re-Current Wound with Skin Substitue within Last Year : No  X-Ray in Last 3 Months: Yes(10/31/19)  NIA In Last 6 Months : Yes  Smoking Status: Non- Smoker  Wound Free from Infection : No Culture Done  Is Wound Free of Eschar, Slough , and / or Bio-Tennyson: No  Malignant Process in Wound : No Biopsy Done  Hemoglobin A1Cin Last 3 Months: Yes  Hemoglobin A1C Last result : 9(11/7/19   8.6)  Type of off Loading: Wheelchair if area off surface of wheelchair  Compression Therapy of 20mm or greater ?: No     Ulcer Description:   Wound Toe (Comment  which one) Right (Active)   Dressing Status Clean 11/12/2019  1:20 PM Non-staged Wound Description Full thickness 10/31/2019  2:34 PM   Wound Length (cm) 0 cm 11/12/2019  1:20 PM   Wound Width (cm) 0 cm 11/12/2019  1:20 PM   Wound Depth (cm) 0 cm 11/12/2019  1:20 PM   Wound Surface Area (cm^2) 0 cm^2 11/12/2019  1:20 PM   Wound Volume (cm^3) 0 cm^3 11/12/2019  1:20 PM   Condition of Base Epithelializing 11/12/2019  1:20 PM   Epithelialization (%) 100 11/12/2019  1:20 PM   Tissue Type Percent Black 100 % 10/31/2019  2:34 PM   Drainage Amount None 11/12/2019  1:20 PM   Drainage Color Serosanguinous 10/31/2019  2:34 PM   Wound Odor None 11/12/2019  1:20 PM   Carmencita-wound Assessment Dry 10/31/2019  2:34 PM   Cleansing and Cleansing Agents  Normal saline 11/12/2019  1:20 PM   Dressing Changed Changed/New 10/31/2019  2:34 PM   Number of days: 33       Wound Heel Right (Active)   Dressing Status Clean, dry, and intact 12/3/2019  1:18 PM   Non-staged Wound Description Full thickness 12/3/2019  1:18 PM   Wound Length (cm) 3.5 cm 12/3/2019  1:18 PM   Wound Width (cm) 1.8 cm 12/3/2019  1:18 PM   Wound Depth (cm) 0.1 cm 12/3/2019  1:18 PM   Wound Surface Area (cm^2) 6.3 cm^2 12/3/2019  1:18 PM   Wound Volume (cm^3) 0.63 cm^3 12/3/2019  1:18 PM   Condition of Base Eschar 12/3/2019  1:18 PM   Tissue Type Percent Black 100 % 12/3/2019  1:18 PM   Drainage Amount None 12/3/2019  1:18 PM   Drainage Color Serosanguinous 10/31/2019  2:34 PM   Wound Odor None 12/3/2019  1:18 PM   Cleansing and Cleansing Agents  Normal saline 12/3/2019  1:18 PM   Dressing Changed Changed/New 11/12/2019  1:20 PM   Number of days: 33         Data Review:   No results found for this or any previous visit (from the past 24 hour(s)). Assessment:     67 y.o. female with R heel combined ulcer. Plan:     Orders Placed This Encounter    WOUND CARE, DRESSING CHANGE     Right Heel Wound, right great toe:  Cleanse wound with normal saline. DO NOT GET WET IN SHOWER, WEAR CAST COVER IN SHOWER!   Apply Betadine to right heel cover with rolled gauze. Change daily. Elevate heels off of bed, do not allow pressure to be put on heels.      Betadine to right great toe. Paint daily     Standing Status:   Standing     Number of Occurrences:   1        Patient understood and agrees with plan. Questions answered. Follow-up Information     Follow up With Specialties Details Why Contact Info    [address] In 2 weeks  [facility] [address] [place] [address]  [phone]             Any procedures done today in the [address] are documented in a separate note in [facility] and made part of this record by reference.      Dictated using voice recognition software; proofread, but unrecognized errors may exist.    Signed By: Jameson Anderson MD     December 3, 2019

## 2019-12-03 NOTE — WOUND CARE
[address] [place] [address], [address] Phone: [phone] Fax: [phone] Patient: Gerardo Rodriges MRN: [id number]  SSN: [id number] YOB: 1940  Age: 67 y.o. Sex: female Return Appointment: 2 weeks with Cammie Watson MD 
 
Instructions: 
 
 Right Heel Wound Cleanse wound with normal saline. DO NOT GET WET IN SHOWER, WEAR CAST COVER IN SHOWER! Apply Betadine to right heel cover with rolled gauze. Change daily. Elevate heels off of bed, do not allow pressure to be put on heels.  
  
Betadine to right great toe. Paint daily Should you experience increased redness, swelling, pain, foul odor, size of wound(s), or have a temperature over 101 degrees please contact the [address] [place] [address] Road at [phone] or if after hours contact your primary care physician or go to the hospital emergency department. Signed By: Julien Em RN December 3, 2019

## 2019-12-17 ENCOUNTER — HOSPITAL ENCOUNTER (OUTPATIENT)
Dept: WOUND CARE | Age: 79
Discharge: HOME | End: 2019-12-17
Attending: SURGERY
Payer: MEDICARE

## 2019-12-17 VITALS — RESPIRATION RATE: 16 BRPM | DIASTOLIC BLOOD PRESSURE: 68 MMHG | SYSTOLIC BLOOD PRESSURE: 163 MMHG | TEMPERATURE: 98.5 F

## 2019-12-17 PROCEDURE — 99212 OFFICE O/P EST SF 10 MIN: CPT

## 2019-12-17 NOTE — PROGRESS NOTES
Pressure areas are  from the eschar and will probably be  by the time we see her in 3 weeks. Continue painting with Betadine daily             Wound Center Progress Note    Patient: Georgette Garcia MRN: [id number]  SSN: [id number]    YOB: 1940  Age: 67 y.o. Sex: female      Subjective:     Chief Complaint:  Georgette Garcia is a 67 y.o. WHITE OR  female who presents with R heel wound of 3 months duration. History of Present Illness:       Wound Caused By: chronic pressure/irritation due to being bedridden  Associated Signs and Symptoms: None  Timing: Intermittent  Quality: Aching  Severity: 2/10  Modifying Factors: Age and immobility  Current Wound Care: See nurses notes    Past Medical History:   Diagnosis Date    Arthritis     Cerebrovascular accident (CVA) ([address]     Chronic pain     Congestive heart failure (CHF) ([address]     Diabetes ([address]     GERD (gastroesophageal reflux disease)     Hypertension     Obstructive sleep apnea     [facility]     Stroke ([facility]     Thyroid disease     Hypothyroidism      Past Surgical History:   Procedure Laterality Date    HX BILATERAL SALPINGO-OOPHORECTOMY      HX CHOLECYSTECTOMY      HX GYN      total abdominal hysterectomy     History reviewed. No pertinent family history. Social History     Tobacco Use    Smoking status: Former Smoker     Packs/day: 2.00     Years: 4.00     Pack years: 8.00     Last attempt to quit:      Years since quittin.9    Smokeless tobacco: Never Used   Substance Use Topics    Alcohol use: Not Currently       Prior to Admission medications    Medication Sig Start Date End Date Taking? Authorizing Provider   esomeprazole (NEXIUM) 40 mg capsule Take 40 mg by mouth daily. Provider, Historical   clopidogrel (PLAVIX) 75 mg tab Take 75 mg by mouth daily. Provider, Historical   furosemide (LASIX) 40 mg tablet Take 40 mg by mouth daily.     Provider, Historical   clonazePAM (KLONOPIN) 1 mg tablet Take 0.5 mg by mouth two (2) times a day. Provider, Historical   levothyroxine (SYNTHROID) 100 mcg tablet Take 100 mcg by mouth Daily (before breakfast). Provider, Historical   venlafaxine (EFFEXOR) 75 mg tablet Take 75 mg by mouth three (3) times daily. Provider, Historical   traMADol (ULTRAM) 50 mg tablet Take 50 mg by mouth every six (6) hours as needed for Pain. Provider, Historical   aspirin delayed-release 81 mg tablet Take 81 mg by mouth daily. Provider, Historical   diclofenac (VOLTAREN) 1 % gel Apply 2 g to affected area four (4) times daily. Provider, Historical   donepezil (ARICEPT) 5 mg tablet Take 5 mg by mouth nightly. Provider, Historical   FLUoxetine (PROZAC) 20 mg capsule Take 20 mg by mouth daily. Provider, Historical   magnesium oxide (MAG-OX) 400 mg tablet Take 400 mg by mouth daily. Provider, Historical   insulin aspart U-100 (NOVOLOG) 100 unit/mL injection 7 Units by SubCUTAneous route Before breakfast, lunch, and dinner. Provider, Historical   OLANZapine (ZYPREXA) 10 mg tablet Take 10 mg by mouth nightly. Provider, Historical   solifenacin (VESICARE) 10 mg tablet Take 10 mg by mouth daily. Provider, Historical   atorvastatin (LIPITOR) 40 mg tablet Take 40 mg by mouth daily. Provider, Historical   Ferrous Fumarate 325 mg (106 mg iron) tab Take 325 mg by mouth. Provider, Historical   insulin glargine (LANTUS U-100 INSULIN) 100 unit/mL injection 50 Units by SubCUTAneous route nightly. Provider, Historical   lisinopril (PRINIVIL, ZESTRIL) 5 mg tablet Take 5 mg by mouth daily. Provider, Historical   memantine ER (NAMENDA XR) 14 mg capsule Take 14 mg by mouth daily. Provider, Historical   insulin NPH/insulin regular (NOVOLIN 70/30, HUMULIN 70/30) 100 unit/mL (70-30) injection 75 Units by SubCUTAneous route two (2) times a day.     Provider, Historical     Allergies   Allergen Reactions    Propoxyphene N-Acetaminophen Hives    Sulfa (Sulfonamide Antibiotics) Hives        Review of Systems:  A comprehensive review of systems was negative except for that written in the History of Present Illness. No results found for: HBA1C, KBC8MRYF, HGBE8, CYD5UNFL, NPW8QXWJ       There is no immunization history on file for this patient. There is no height or weight on file to calculate BMI. Counseling regarding nutrition done: No     Current medications:  Current Outpatient Medications   Medication Sig Dispense Refill    esomeprazole (NEXIUM) 40 mg capsule Take 40 mg by mouth daily.  clopidogrel (PLAVIX) 75 mg tab Take 75 mg by mouth daily.  furosemide (LASIX) 40 mg tablet Take 40 mg by mouth daily.  clonazePAM (KLONOPIN) 1 mg tablet Take 0.5 mg by mouth two (2) times a day.  levothyroxine (SYNTHROID) 100 mcg tablet Take 100 mcg by mouth Daily (before breakfast).  venlafaxine (EFFEXOR) 75 mg tablet Take 75 mg by mouth three (3) times daily.  traMADol (ULTRAM) 50 mg tablet Take 50 mg by mouth every six (6) hours as needed for Pain.  aspirin delayed-release 81 mg tablet Take 81 mg by mouth daily.  diclofenac (VOLTAREN) 1 % gel Apply 2 g to affected area four (4) times daily.  donepezil (ARICEPT) 5 mg tablet Take 5 mg by mouth nightly.  FLUoxetine (PROZAC) 20 mg capsule Take 20 mg by mouth daily.  magnesium oxide (MAG-OX) 400 mg tablet Take 400 mg by mouth daily.  insulin aspart U-100 (NOVOLOG) 100 unit/mL injection 7 Units by SubCUTAneous route Before breakfast, lunch, and dinner.  OLANZapine (ZYPREXA) 10 mg tablet Take 10 mg by mouth nightly.  solifenacin (VESICARE) 10 mg tablet Take 10 mg by mouth daily.  atorvastatin (LIPITOR) 40 mg tablet Take 40 mg by mouth daily.  Ferrous Fumarate 325 mg (106 mg iron) tab Take 325 mg by mouth.  insulin glargine (LANTUS U-100 INSULIN) 100 unit/mL injection 50 Units by SubCUTAneous route nightly.       lisinopril (PRINIVIL, ZESTRIL) 5 mg tablet Take 5 mg by mouth daily.  memantine ER (NAMENDA XR) 14 mg capsule Take 14 mg by mouth daily.  insulin NPH/insulin regular (NOVOLIN 70/30, HUMULIN 70/30) 100 unit/mL (70-30) injection 75 Units by SubCUTAneous route two (2) times a day. Objective:     Physical Exam:     Visit Vitals  /68 (BP 1 Location: Right arm, BP Patient Position: Sitting)   Temp 98.5 °F (36.9 °C)   Resp 16       General: well developed, well nourished, pleasant , NAD. Hygiene good  Psych: cooperative. Pleasant. No anxiety or depression. Normal mood and affect. Neuro: alert and oriented to person/place/situation. Otherwise nonfocal.  Derm: Normal turgor for age, dry skin  HEENT: Normocephalic, atraumatic. EOMI. Conjunctiva clear. No scleral icterus. Neck: Normal range of motion. No masses. Chest: Good air entry bilaterally. Respirations nonlabored  Cardio: Normal heart sounds,no rubs, murmurs or gallops  Abdomen: Soft, nontender, nondistended, normoactive bowel sounds  Lower extremities: color normal; temperature normal. Hair growth is not present. Calves are supple, nontender, approximately equally sized in comparison.  Capillary refill <3 sec      Diabetic Foot Ulcer/Neuropathic   Is Wound Greater than 1.0 sq cm ? : Yes  Re-Current Wound with Skin Substitue within Last Year : No  X-Ray in Last 3 Months: Yes  NIA In Last 6 Months : Yes  Smoking Status: Non- Smoker  Wound Free from Infection : No Culture Done  Is Wound Free of Eschar, Slough , and / or Bio-Ira: No  Malignant Process in Wound : No Biopsy Done  Hemoglobin A1Cin Last 3 Months: Yes  Hemoglobin A1C Last result : (8.6)  Type of off Loading: Wheelchair if area off surface of wheelchair  Compression Therapy of 20mm or greater ?: No     Ulcer Description:   Wound Heel Right (Active)   Dressing Status Clean, dry, and intact 12/17/2019  1:34 PM   Non-staged Wound Description Full thickness 12/17/2019  1:34 PM   Wound Length (cm) 3 cm 12/17/2019  1:34 PM   Wound Width (cm) 1 cm 12/17/2019  1:34 PM   Wound Depth (cm) 0.1 cm 12/17/2019  1:34 PM   Wound Surface Area (cm^2) 3 cm^2 12/17/2019  1:34 PM   Wound Volume (cm^3) 0.3 cm^3 12/17/2019  1:34 PM   Condition of Base Eschar 12/17/2019  1:34 PM   Tissue Type Percent Black 100 % 12/17/2019  1:34 PM   Drainage Amount Small 12/17/2019  1:34 PM   Drainage Color Serosanguinous 12/17/2019  1:34 PM   Wound Odor None 12/17/2019  1:34 PM   Cleansing and Cleansing Agents  Soap and water 12/17/2019  1:34 PM   Dressing Changed Changed/New 12/17/2019  1:34 PM   Procedure Tolerated Well 12/17/2019  1:34 PM   Number of days: 52       [REMOVED] Wound Toe (Comment  which one) Right (Removed)   Dressing Status Clean 12/3/2019  1:18 PM   Non-staged Wound Description Full thickness 10/31/2019  2:34 PM   Wound Length (cm) 0 cm 12/3/2019  1:18 PM   Wound Width (cm) 0 cm 12/3/2019  1:18 PM   Wound Depth (cm) 0 cm 12/3/2019  1:18 PM   Wound Surface Area (cm^2) 0 cm^2 12/3/2019  1:18 PM   Wound Volume (cm^3) 0 cm^3 12/3/2019  1:18 PM   Condition of Base Epithelializing 11/12/2019  1:20 PM   Epithelialization (%) 100 11/12/2019  1:20 PM   Tissue Type Percent Black 100 % 10/31/2019  2:34 PM   Drainage Amount None 12/3/2019  1:18 PM   Drainage Color Serosanguinous 10/31/2019  2:34 PM   Wound Odor None 12/3/2019  1:18 PM   Carmencita-wound Assessment Dry 10/31/2019  2:34 PM   Cleansing and Cleansing Agents  Normal saline 12/3/2019  1:18 PM   Dressing Changed Changed/New 10/31/2019  2:34 PM   Number of days: 47         Data Review:   No results found for this or any previous visit (from the past 24 hour(s)). Assessment:     67 y.o. female with R heel ischemic ulcer. Plan:     Orders Placed This Encounter    WOUND CARE, DRESSING CHANGE     Right Heel Wound  Cleanse wound with normal saline. DO NOT GET WET IN SHOWER, WEAR CAST COVER IN SHOWER! Apply Betadine to right heel cover with rolled gauze. Change daily.    Elevate heels off of bed, do not allow pressure to be put on heels.      Betadine to right great toe. Paint daily     Standing Status:   Standing     Number of Occurrences:   1        Patient understood and agrees with plan. Questions answered. Follow-up Information     Follow up With Specialties Details Why Contact Info    [address] In 3 weeks  [address] Alfredo Chambers [address] [address]  [phone]             Any procedures done today in the [facility] are documented in a separate note in [facility] and made part of this record by reference.      Dictated using voice recognition software; proofread, but unrecognized errors may exist.    Signed By: Best Koch MD     December 17, 2019

## 2019-12-17 NOTE — WOUND CARE
12/17/19 1334 Wound Heel Right Date First Assessed/Time First Assessed: 10/31/19 1434   Present on Hospital Admission: Yes  Primary Wound Type: Diabetic Ulcer  Location: Heel  Wound Location Orientation: Right Dressing Status Clean, dry, and intact Dressing Type  
(betadine, abd, rolled gauze) Non-staged Wound Description Full thickness Wound Length (cm) 3 cm Wound Width (cm) 1 cm Wound Depth (cm) 0.1 cm Wound Surface Area (cm^2) 3 cm^2 Wound Volume (cm^3) 0.3 cm^3 Condition of Base Eschar Tissue Type Percent Black 100 % Drainage Amount Small Drainage Color Serosanguinous Wound Odor None Cleansing and Cleansing Agents  Soap and water Dressing Changed Changed/New Procedure Tolerated Well Patient is on an anti-coagulant daily ASA81 and plavix

## 2019-12-17 NOTE — WOUND CARE
[address] [place] [address], [address] [address] Phone: [phone] Fax: [phone] Patient: Feliciano Aldana MRN: [id number]  SSN: [id number] YOB: 1940  Age: 67 y.o. Sex: female Return Appointment: 3 weeks with Nicolette Rueda MD 
 
Instructions:  Right Heel Wound Cleanse wound with normal saline. DO NOT GET WET IN SHOWER, WEAR CAST COVER IN SHOWER! Apply Betadine to right heel cover with rolled gauze. Change daily. Elevate heels off of bed, do not allow pressure to be put on heels.  
  
Betadine to right great toe. Paint daily Should you experience increased redness, swelling, pain, foul odor, size of wound(s), or have a temperature over 101 degrees please contact the [address] [place] [address] Road at [phone] or if after hours contact your primary care physician or go to the hospital emergency department. Signed By: Ethan Tineo December 17, 2019

## 2019-12-17 NOTE — WOUND CARE
Clinic Level of Care Assessment NAME:  Elian Hampton YOB: 1940 GENDER: female MEDICAL RECORD NUMBER:  [id number] DATE:  12/17/2019 Wound Count Document in [facility] Number of Wounds Assessed Points No Wounds/Ulcers []   0 Less than Three Wounds/Ulcers [x]   1  
3-6 Wounds/Ulcers []   2 Greater than 6 Wounds/Ulcers []   3 Ambulation Status Document in Coord/HIGINIO/Mobility tab Status Definition Points Independent Independently able to ambulate. Fully able (without any assistance) to get on/off exam table/chair. []   0 Minimal Physical Assistance Requires physical assistance of one person to ambulate and/or position patient to be examined. Includes necessary physical assistance to position lower extremities on/off stool. [x]   1 Moderate Physical Assistance Requires at least one staff member to physically assist patient in ambulating into treatment room, and on/off exam table. []   2 Full Assistance Requires assistance of at least two staff members to transfer patient into treatment room and/or on/off exam table/chair. \"Total Transfer\". []   3 Dressing Complexity Document in [facility] and Write Appropriate Order Complexity Definition Points No Dressing  []   0 Simple Minimal, simple dressing. i.e. Band-aid, gauze, simple wrap. [x]   1 Intermediate Moderately complicated requiring licensed personnel to apply i.e. collagen matrix, ointments, gels, alginates. []   2 Complex Complicated requiring licensed personnel to apply dressings 6 or more wounds. []   3 Teaching Effort Document in Education Tab Effort Definition Points No Teaching  []   0 Simple Reinforce two or less topics. Document in Education navigator. [x]   1 Intermediate Reinforce three to five topics and/or one additional  
new topic. Document in Education navigator. []   2 Complex Teach more than one new topic. New patient information  
packet reviewed and/or reinforce more than three topics. Document in Education navigator. HBO initial instruction. []   3 Patient Assessment and Planning Planning Definition Points Simple Multiple System Simple: Simple follow-up with routine assessment and planning. If Discharged, instructions and long term/follow-up care given to patient/caregiver. Discharged, instructions and/or After Visit Summary given to patient/caregiver and instructions completed. [x]   1 Intermediate Multiple System Intermediate: Contact with outside resources; i.e. [place]phone calls to [place] health, [facility] May include filling out forms and writing letters, arranging transportation, communication with insurance , vendors, etc.  Discharged, instructions and/or After Visit Summary given to patient/caregiver and instructions completed. []   2 Complex Multiple System Complex: Full, comprehensive assessment and planning. Follow the entire navigator under Wound Visit charting filling out each tab which includes OP Adm Database Screening, Education and CarePlan  HBO risk assessment completed. Discharged, instructions and/or After Visit Summary given to patient/caregiver and instructions completed. []   3 Is this the Patient's First Visit to the [facility] [address] No 
 
 
Is this Patient Established @ [facility] 
Yes Clinical Level of Care Points  0-2  Level 1 [] Points  3-5  Level 2 [x] Points  6-9  Level 3 [] Points  10-12  Level 4 [] Points  13-15  Level 5 [] Electronically signed by Raiza Carlton on 12/17/2019 at 1:44 PM

## 2020-01-07 ENCOUNTER — HOSPITAL ENCOUNTER (OUTPATIENT)
Dept: WOUND CARE | Age: 80
Discharge: HOME | End: 2020-01-07
Attending: SURGERY
Payer: MEDICARE

## 2020-01-07 VITALS
TEMPERATURE: 98.4 F | DIASTOLIC BLOOD PRESSURE: 68 MMHG | HEART RATE: 50 BPM | SYSTOLIC BLOOD PRESSURE: 142 MMHG | BODY MASS INDEX: 36.8 KG/M2 | WEIGHT: 200 LBS | OXYGEN SATURATION: 99 % | RESPIRATION RATE: 18 BRPM | HEIGHT: 62 IN

## 2020-01-07 PROCEDURE — 99213 OFFICE O/P EST LOW 20 MIN: CPT

## 2020-01-07 NOTE — WOUND CARE
Clinic Level of Care Assessment    NAME:  Felicity Souza  YOB: 1940 GENDER: female  MEDICAL RECORD NUMBER:  [id number]   DATE:  1/7/2020      Wound Count Document in [address] [place] [address]  Number of Wounds Assessed Points   No Wounds/Ulcers []   0   Less than Three Wounds/Ulcers [x]   1   3-6 Wounds/Ulcers []   2   Greater than 6 Wounds/Ulcers []   3     Ambulation Status Document in Coord/HIGINIO/Mobility tab  Status Definition Points   Independent Independently able to ambulate. Fully able (without any assistance) to get on/off exam table/chair. []   0   Minimal Physical Assistance Requires physical assistance of one person to ambulate and/or position patient to be examined. Includes necessary physical assistance to position lower extremities on/off stool. []   1   Moderate Physical Assistance Requires at least one staff member to physically assist patient in ambulating into treatment room, and on/off exam table. [x]   2   Full Assistance Requires assistance of at least two staff members to transfer patient into treatment room and/or on/off exam table/chair. \"Total Transfer\". []   3     Dressing Complexity Document in LDA and Write Appropriate Order  Complexity Definition Points   No Dressing  []   0   Simple Minimal, simple dressing. i.e. Band-aid, gauze, simple wrap. [x]   1   Intermediate Moderately complicated requiring licensed personnel to apply i.e. collagen matrix, ointments, gels, alginates. []   2   Complex Complicated requiring licensed personnel to apply dressings 6 or more wounds. []   3     Teaching Effort Document in Education Tab   Effort Definition Points   No Teaching  []   0   Simple Reinforce two or less topics. Document in Education navigator. [x]   1   Intermediate Reinforce three to five topics and/or one additional   new topic. Document in Education navigator. []   2   Complex Teach more than one new topic.  New patient information   packet reviewed and/or reinforce more than three topics. Document in Education navigator. HBO initial instruction. []   3       Patient Assessment and Planning  Planning Definition Points   Simple Multiple System Simple: Simple follow-up with routine assessment and planning. If Discharged, instructions and long term/follow-up care given to patient/caregiver. Discharged, instructions and/or After Visit Summary given to patient/caregiver and instructions completed. [x]   1   Intermediate Multiple System Intermediate: Contact with outside resources; i.e. Telephone calls to [place] health, [facility]. May include filling out forms and writing letters, arranging transportation, communication with insurance , vendors, etc.  Discharged, instructions and/or After Visit Summary given to patient/caregiver and instructions completed. []   2   Complex Multiple System Complex: Full, comprehensive assessment and planning. Follow the entire navigator under Wound Visit charting filling out each tab which includes OP Adm Database Screening, Education and CarePlan  HBO risk assessment completed. Discharged, instructions and/or After Visit Summary given to patient/caregiver and instructions completed.    []   3           Is this the Patient's First Visit to the [address] [place] [address] Road  No      Is this Patient Established @ [facility]  Yes             Clinical Level of Care      Points  0-2  Level 1 []     Points  3-5  Level 2 []     Points  6-9  Level 3 [x]     Points  10-12  Level 4 []     Points  13-15  Level 5 []       Electronically signed by Adriana Mcclellan RN on 1/7/2020 at 1:42 PM

## 2020-01-07 NOTE — PROGRESS NOTES
Ms. Salvador Arvada returns today with eschar on the wounds one is healed other is healing we will continue with Betadine paints and we will see her again in a couple weeks. Wound Center Progress Note    Patient: Neil Hernandez MRN: [id number]  SSN: [id number]    YOB: 1940  Age: 67 y.o. Sex: female      Subjective:     Chief Complaint:  Neil Hernandez is a 67 y.o. WHITE OR  female who presents with R heel wound of 3 months duration. History of Present Illness:       Wound Caused By: chronic pressure/irritation due to bedrest  Associated Signs and Symptoms: None  Timing: Intermittent  Quality: Aching  Severity: 210  Modifying Factors: Inability to move around  Current Wound Care: See nurses notes    Past Medical History:   Diagnosis Date    Arthritis     [facility]     Chronic pain     [facility]est[facility] failure (CHF) [facility]     Diabetes ([facility]     GERD (gastroesophageal reflux disease)     Hypertension     Obstructive sleep apnea     Pulmonary hypertension ([facility]     Stroke ([facility]     Thyroid disease     Hypothyroidism      Past Surgical History:   Procedure Laterality Date    HX BILATERAL SALPINGO-OOPHORECTOMY      HX CHOLECYSTECTOMY      HX GYN      total abdominal hysterectomy     History reviewed. No pertinent family history. Social History     Tobacco Use    Smoking status: Former Smoker     Packs/day: 2.00     Years: 4.00     Pack years: 8.00     Last attempt to quit:      Years since quittin.0    Smokeless tobacco: Never Used   Substance Use Topics    Alcohol use: Not Currently       Prior to Admission medications    Medication Sig Start Date End Date Taking? Authorizing Provider   esomeprazole (NEXIUM) 40 mg capsule Take 40 mg by mouth daily. Provider, Historical   clopidogrel (PLAVIX) 75 mg tab Take 75 mg by mouth daily. Provider, Historical   furosemide (LASIX) 40 mg tablet Take 40 mg by mouth daily.     Provider, Historical clonazePAM (KLONOPIN) 1 mg tablet Take 0.5 mg by mouth two (2) times a day. Provider, Historical   levothyroxine (SYNTHROID) 100 mcg tablet Take 100 mcg by mouth Daily (before breakfast). Provider, Historical   venlafaxine (EFFEXOR) 75 mg tablet Take 75 mg by mouth three (3) times daily. Provider, Historical   traMADol (ULTRAM) 50 mg tablet Take 50 mg by mouth every six (6) hours as needed for Pain. Provider, Historical   aspirin delayed-release 81 mg tablet Take 81 mg by mouth daily. Provider, Historical   diclofenac (VOLTAREN) 1 % gel Apply 2 g to affected area four (4) times daily. Provider, Historical   donepezil (ARICEPT) 5 mg tablet Take 5 mg by mouth nightly. Provider, Historical   FLUoxetine (PROZAC) 20 mg capsule Take 20 mg by mouth daily. Provider, Historical   magnesium oxide (MAG-OX) 400 mg tablet Take 400 mg by mouth daily. Provider, Historical   insulin aspart U-100 (NOVOLOG) 100 unit/mL injection 7 Units by SubCUTAneous route Before breakfast, lunch, and dinner. Provider, Historical   OLANZapine (ZYPREXA) 10 mg tablet Take 10 mg by mouth nightly. Provider, Historical   solifenacin (VESICARE) 10 mg tablet Take 10 mg by mouth daily. Provider, Historical   atorvastatin (LIPITOR) 40 mg tablet Take 40 mg by mouth daily. Provider, Historical   Ferrous Fumarate 325 mg (106 mg iron) tab Take 325 mg by mouth. Provider, Historical   insulin glargine (LANTUS U-100 INSULIN) 100 unit/mL injection 50 Units by SubCUTAneous route nightly. Provider, Historical   lisinopril (PRINIVIL, ZESTRIL) 5 mg tablet Take 5 mg by mouth daily. Provider, Historical   memantine ER (NAMENDA XR) 14 mg capsule Take 14 mg by mouth daily. Provider, Historical   insulin NPH/insulin regular (NOVOLIN 70/30, HUMULIN 70/30) 100 unit/mL (70-30) injection 75 Units by SubCUTAneous route two (2) times a day.     Provider, Historical     Allergies   Allergen Reactions    Propoxyphene N-Acetaminophen Hives    Sulfa (Sulfonamide Antibiotics) Hives        Review of Systems:  A comprehensive review of systems was negative except for that written in the History of Present Illness. No results found for: HBA1C, PZD6EVIQ, HGBE8, ZJK7MNOD, PRK3ZIYS       There is no immunization history on file for this patient. Body mass index is 36.58 kg/m². Counseling regarding nutrition done: No     Current medications:  Current Outpatient Medications   Medication Sig Dispense Refill    esomeprazole (NEXIUM) 40 mg capsule Take 40 mg by mouth daily.  clopidogrel (PLAVIX) 75 mg tab Take 75 mg by mouth daily.  furosemide (LASIX) 40 mg tablet Take 40 mg by mouth daily.  clonazePAM (KLONOPIN) 1 mg tablet Take 0.5 mg by mouth two (2) times a day.  levothyroxine (SYNTHROID) 100 mcg tablet Take 100 mcg by mouth Daily (before breakfast).  venlafaxine (EFFEXOR) 75 mg tablet Take 75 mg by mouth three (3) times daily.  traMADol (ULTRAM) 50 mg tablet Take 50 mg by mouth every six (6) hours as needed for Pain.  aspirin delayed-release 81 mg tablet Take 81 mg by mouth daily.  diclofenac (VOLTAREN) 1 % gel Apply 2 g to affected area four (4) times daily.  donepezil (ARICEPT) 5 mg tablet Take 5 mg by mouth nightly.  FLUoxetine (PROZAC) 20 mg capsule Take 20 mg by mouth daily.  magnesium oxide (MAG-OX) 400 mg tablet Take 400 mg by mouth daily.  insulin aspart U-100 (NOVOLOG) 100 unit/mL injection 7 Units by SubCUTAneous route Before breakfast, lunch, and dinner.  OLANZapine (ZYPREXA) 10 mg tablet Take 10 mg by mouth nightly.  solifenacin (VESICARE) 10 mg tablet Take 10 mg by mouth daily.  atorvastatin (LIPITOR) 40 mg tablet Take 40 mg by mouth daily.  Ferrous Fumarate 325 mg (106 mg iron) tab Take 325 mg by mouth.  insulin glargine (LANTUS U-100 INSULIN) 100 unit/mL injection 50 Units by SubCUTAneous route nightly.       lisinopril (PRINIVIL, ZESTRIL) 5 mg tablet Take 5 mg by mouth daily.  memantine ER (NAMENDA XR) 14 mg capsule Take 14 mg by mouth daily.  insulin NPH/insulin regular (NOVOLIN 70/30, HUMULIN 70/30) 100 unit/mL (70-30) injection 75 Units by SubCUTAneous route two (2) times a day. Objective:     Physical Exam:     Visit Vitals  /68 (BP 1 Location: Right arm, BP Patient Position: Sitting)   Pulse (!) 50   Temp 98.4 °F (36.9 °C)   Resp 18   Ht 5' 2\" (1.575 m)   Wt 90.7 kg (200 lb)   SpO2 99%   BMI 36.58 kg/m²       General: well developed, well nourished, pleasant , NAD. Hygiene good  Psych: cooperative. Pleasant. No anxiety or depression. Normal mood and affect. Neuro: alert and oriented to person/place/situation. Otherwise nonfocal.  Derm: Normal turgor for age, dry skin  HEENT: Normocephalic, atraumatic. EOMI. Conjunctiva clear. No scleral icterus. Neck: Normal range of motion. No masses. Chest: Good air entry bilaterally. Respirations nonlabored  Cardio: Normal heart sounds,no rubs, murmurs or gallops  Abdomen: Soft, nontender, nondistended, normoactive bowel sounds  Lower extremities: color normal; temperature normal. Hair growth is not present. Calves are supple, nontender, approximately equally sized in comparison.  Capillary refill <3 sec      Diabetic Foot Ulcer/Neuropathic   Is Wound Greater than 1.0 sq cm ? : Yes  Re-Current Wound with Skin Substitue within Last Year : No  X-Ray in Last 3 Months: Yes  NIA In Last 6 Months : Yes  Smoking Status: Non- Smoker  Wound Free from Infection : No Culture Done  Is Wound Free of [address], [place] , and / or Bio[place]: No  Malignant Process in Wound : No Biopsy Done  Hemoglobin A1Cin Last 3 Months: Yes  Type of off Loading: Wheelchair if area off surface of wheelchair  Compression Therapy of 20mm or greater ?: No     Ulcer Description:   Wound Heel Right (Active)   Dressing Status Clean, dry, and intact 1/7/2020  1:12 PM   Non-staged Wound Description Full thickness 1/7/2020  1:12 PM   Wound Length (cm) 2.5 cm 1/7/2020  1:12 PM   Wound Width (cm) 1.7 cm 1/7/2020  1:12 PM   Wound Depth (cm) 0.3 cm 1/7/2020  1:12 PM   Wound Surface Area (cm^2) 4.25 cm^2 1/7/2020  1:12 PM   Wound Volume (cm^3) 1.27 cm^3 1/7/2020  1:12 PM   Condition of Base Granulation;Slough 1/7/2020  1:12 PM   Tissue Type Percent Black 100 % 12/17/2019  1:34 PM   Tissue Type Percent Red 50 1/7/2020  1:12 PM   Tissue Type Percent Yellow 50 1/7/2020  1:12 PM   Drainage Amount Moderate 1/7/2020  1:12 PM   Drainage Color Serous 1/7/2020  1:12 PM   Wound Odor None 1/7/2020  1:12 PM   Cleansing and Cleansing Agents  Normal saline 1/7/2020  1:12 PM   Dressing Changed Changed/New 1/7/2020  1:12 PM   Procedure Tolerated Well 12/17/2019  1:34 PM   Number of days: 68       [REMOVED] Wound Toe (Comment  which one) Right (Removed)   Dressing Status Clean 12/3/2019  1:18 PM   Non-staged Wound Description Full thickness 10/31/2019  2:34 PM   Wound Length (cm) 0 cm 12/3/2019  1:18 PM   Wound Width (cm) 0 cm 12/3/2019  1:18 PM   Wound Depth (cm) 0 cm 12/3/2019  1:18 PM   Wound Surface Area (cm^2) 0 cm^2 12/3/2019  1:18 PM   Wound Volume (cm^3) 0 cm^3 12/3/2019  1:18 PM   Condition of Base Epithelializing 11/12/2019  1:20 PM   Epithelialization (%) 100 11/12/2019  1:20 PM   Tissue Type Percent Black 100 % 10/31/2019  2:34 PM   Drainage Amount None 12/3/2019  1:18 PM   Drainage Color Serosanguinous 10/31/2019  2:34 PM   Wound Odor None 12/3/2019  1:18 PM   Carmencita-wound Assessment Dry 10/31/2019  2:34 PM   Cleansing and Cleansing Agents  Normal saline 12/3/2019  1:18 PM   Dressing Changed Changed/New 10/31/2019  2:34 PM   Number of days: 47         Data Review:   No results found for this or any previous visit (from the past 24 hour(s)). Assessment:     67 y.o. female with R heel ischemic ulcer.          Plan:     Orders Placed This Encounter    WOUND CARE, DRESSING CHANGE     Right Heel Wound  Cleanse wound with normal saline. DO NOT GET WET IN SHOWER, WEAR CAST COVER IN SHOWER! Apply Betadine to right heel cover with rolled gauze. Change daily. Elevate heels off of bed, do not allow pressure to be put on heels.      Betadine to right great toe.  Paint daily        Standing Status:   Standing     Number of Occurrences:   1        Patient understood and agrees with plan. Questions answered. Follow-up Information     Follow up With Specialties Details Why Contact Info    [address] In 3 weeks For wound re-check [address] [facility] [address] [phone]             Any procedures done today in the [address] are documented in a separate note in [facility] and made part of this record by reference.      Dictated using voice recognition software; proofread, but unrecognized errors may exist.    Signed By: Juan Miguel Crespo MD     January 7, 2020

## 2020-01-07 NOTE — WOUND CARE
01/07/20 1312   Wound Heel Right   Date First Assessed/Time First Assessed: 10/31/19 1434   Present on Hospital Admission: Yes  Primary Wound Type: Diabetic Ulcer  Location: Heel  Wound Location Orientation: Right   Dressing Status Clean, dry, and intact   Dressing Type   (Betadine Rolled Gauze)   Non-staged Wound Description Full thickness   Wound Length (cm) 2.5 cm   Wound Width (cm) 1.7 cm   Wound Depth (cm) 0.3 cm   Wound Surface Area (cm^2) 4.25 cm^2   Wound Volume (cm^3) 1.27 cm^3   Condition of Base Granulation;Slough   Tissue Type Percent Red 50   Tissue Type Percent Yellow 50   Drainage Amount Moderate   Drainage Color Serous   Wound Odor None   Cleansing and Cleansing Agents  Normal saline   Dressing Changed Changed/New       Patient takes ASA and Plavix Daily

## 2020-01-28 ENCOUNTER — HOSPITAL ENCOUNTER (OUTPATIENT)
Dept: WOUND CARE | Age: 80
Discharge: HOME | End: 2020-01-28
Attending: SURGERY
Payer: MEDICARE

## 2020-01-28 VITALS
OXYGEN SATURATION: 99 % | DIASTOLIC BLOOD PRESSURE: 71 MMHG | RESPIRATION RATE: 20 BRPM | WEIGHT: 220 LBS | TEMPERATURE: 98 F | HEIGHT: 62 IN | SYSTOLIC BLOOD PRESSURE: 126 MMHG | BODY MASS INDEX: 40.48 KG/M2 | HEART RATE: 43 BPM

## 2020-01-28 PROCEDURE — 99213 OFFICE O/P EST LOW 20 MIN: CPT

## 2020-01-28 NOTE — WOUND CARE
[address] [place] [address] [place], [address] Phone: [phone] Fax: [phone] Patient: Antonia Daniels MRN: [id number]  SSN: [id number] YOB: 1940  Age: 67 y.o. Sex: female Return Appointment: 3 weeks with Jose Ayala MD 
 
Instructions:  
Right Heel Wound: 
Cleanse wound with normal saline. DO NOT GET WET IN SHOWER, WEAR CAST COVER IN SHOWER! Apply Betadine to right heel cover with rolled gauze. Change daily. Elevate heels off of bed, do not allow pressure to be put on heels.  
  
Betadine to right great toe.  [place] daily. **[facility] facility- Please purchase heel cushions for offloading heel. May purchase online at [email or web address]. Should you experience increased redness, swelling, pain, foul odor, size of wound(s), or have a temperature over 101 degrees please contact the [address] [place] [address] at [phone] or if after hours contact your primary care physician or go to the hospital emergency department. Signed By: Jesus Peterson RN   
 January 28, 2020

## 2020-01-28 NOTE — WOUND CARE
01/28/20 1335 Wound Heel Right Date First Assessed/Time First Assessed: 10/31/19 1434   Present on Hospital Admission: Yes  Primary Wound Type: Diabetic Ulcer  Location: Heel  Wound Location Orientation: Right Dressing Status Old drainage Dressing Type  
(betadine, ABD, rolled gauze) Non-staged Wound Description Full thickness Wound Length (cm) 1.5 cm Wound Width (cm) 1.2 cm Wound Depth (cm) 0.2 cm Wound Surface Area (cm^2) 1.8 cm^2 Wound Volume (cm^3) 0.36 cm^3 Condition of Base Granulation;Slough Tissue Type Percent Red 50 Tissue Type Percent Yellow 50 Drainage Amount Moderate Drainage Color Serosanguinous Wound Odor None Carmencita-wound Assessment Intact Cleansing and Cleansing Agents  Soap and water;Normal saline PATIENT IS ON ANTICOAGULANT ASA AND PLAVIX.

## 2020-01-28 NOTE — WOUND CARE
Clinic Level of Care Assessment NAME:  Tushar Madden YOB: 1940 GENDER: female MEDICAL RECORD NUMBER:  [id number] DATE:  1/28/2020 Wound Count Document in [address] [place] [address] Number of Wounds Assessed Points No Wounds/Ulcers []   0 Less than Three Wounds/Ulcers [x]   1  
3-6 Wounds/Ulcers []   2 Greater than 6 Wounds/Ulcers []   3 Ambulation Status Document in Coord/HIGINIO/Mobility tab Status Definition Points Independent Independently able to ambulate. Fully able (without any assistance) to get on/off exam table/chair. []   0 Minimal Physical Assistance Requires physical assistance of one person to ambulate and/or position patient to be examined. Includes necessary physical assistance to position lower extremities on/off stool. [x]   1 Moderate Physical Assistance Requires at least one staff member to physically assist patient in ambulating into treatment room, and on/off exam table. []   2 Full Assistance Requires assistance of at least two staff members to transfer patient into treatment room and/or on/off exam table/chair. \"Total Transfer\". []   3 Dressing Complexity Document in [address] [place] [address] and Write Appropriate Order Complexity Definition Points No Dressing  []   0 Simple Minimal, simple dressing. i.e. Band-aid, gauze, simple wrap. []   1 Intermediate Moderately complicated requiring licensed personnel to apply i.e. collagen matrix, ointments, gels, alginates. [x]   2 Complex Complicated requiring licensed personnel to apply dressings 6 or more wounds. []   3 Teaching Effort Document in Education Tab Effort Definition Points No Teaching  []   0 Simple Reinforce two or less topics. Document in Education navigator. [x]   1 Intermediate Reinforce three to five topics and/or one additional  
new topic. Document in Education navigator. []   2 Complex Teach more than one new topic. New patient information  
packet reviewed and/or reinforce more than three topics. Document in Education navigator. HBO initial instruction. []   3 Patient Assessment and Planning Planning Definition Points Simple Multiple System Simple: Simple follow-up with routine assessment and planning. If Discharged, instructions and long term/follow-up care given to patient/caregiver. Discharged, instructions and/or After Visit Summary given to patient/caregiver and instructions completed. [x]   1 Intermediate Multiple System Intermediate: Contact with outside resources; i.e. Telephone calls to home health, [facility]. May include filling out forms and writing letters, arranging transportation, communication with insurance , vendors, etc.  Discharged, instructions and/or After Visit Summary given to patient/caregiver and instructions completed. []   2 Complex Multiple System Complex: Full, comprehensive assessment and planning. Follow the entire navigator under Wound Visit charting filling out each tab which includes OP Adm Database Screening, Education and CarePlan  HBO risk assessment completed. Discharged, instructions and/or After Visit Summary given to patient/caregiver and instructions completed. []   3 Is this the Patient's First Visit to the [address] [place] [address] Road No 
 
 
Is this Patient Established @ [facility] 
Yes Clinical Level of Care Points  0-2  Level 1 [] Points  3-5  Level 2 [] Points  6-9  Level 3 [x] Points  10-12  Level 4 [] Points  13-15  Level 5 [] Electronically signed by Nuzhat Sheridan RN on 1/28/2020 at 3:10 PM

## 2020-01-28 NOTE — PROGRESS NOTES
Patient returns and the heel ulcer has gotten smaller and is dry. We will get her facility to order a convoluted boot for her. We will continue pain this with Betadine and see her again in 2 weeks. Wound Center Progress Note    Patient: Ry Marina MRN: [id number]  SSN: [id number]    YOB: 1940  Age: 67 y.o. Sex: female      Subjective:     Chief Complaint:  Ry Marina is a 67 y.o. WHITE OR  female who presents with R heel wound of 2 months duration. History of Present Illness:       Wound Caused By: chronic pressure/irritation due to diabetes  Associated Signs and Symptoms: Mild pain  Timing: Intermittent  Quality: Burning  Severity: 2/10  Modifying Factors: Diabetes mellitus and obesity  Current Wound Care: See nurses notes    Past Medical History:   Diagnosis Date    Arthritis     Cerebrovascular accident (CVA) ([address]     Chronic pain     [facility]     [facility]     GERD (gastroesophageal reflux disease)     Hypertension     Obstructive sleep apnea     Pulmonary hypertension [facility]     Stroke [facility]     Thyroid disease     Hypothyroidism      Past Surgical History:   Procedure Laterality Date    HX BILATERAL SALPINGO-OOPHORECTOMY      HX CHOLECYSTECTOMY      HX GYN      total abdominal hysterectomy     History reviewed. No pertinent family history. Social History     Tobacco Use    Smoking status: Former Smoker     Packs/day: 2.00     Years: 4.00     Pack years: 8.00     Last attempt to quit:      Years since quittin.0    Smokeless tobacco: Never Used   Substance Use Topics    Alcohol use: Not Currently       Prior to Admission medications    Medication Sig Start Date End Date Taking? Authorizing Provider   esomeprazole (NEXIUM) 40 mg capsule Take 40 mg by mouth daily. Provider, Historical   clopidogrel (PLAVIX) 75 mg tab Take 75 mg by mouth daily.     Provider, Historical   furosemide (LASIX) 40 mg tablet Take 40 mg by mouth daily. Provider, Historical   clonazePAM (KLONOPIN) 1 mg tablet Take 0.5 mg by mouth two (2) times a day. Provider, Historical   levothyroxine (SYNTHROID) 100 mcg tablet Take 100 mcg by mouth Daily (before breakfast). Provider, Historical   venlafaxine (EFFEXOR) 75 mg tablet Take 75 mg by mouth three (3) times daily. Provider, Historical   traMADol (ULTRAM) 50 mg tablet Take 50 mg by mouth every six (6) hours as needed for Pain. Provider, Historical   aspirin delayed-release 81 mg tablet Take 81 mg by mouth daily. Provider, Historical   diclofenac (VOLTAREN) 1 % gel Apply 2 g to affected area four (4) times daily. Provider, Historical   donepezil (ARICEPT) 5 mg tablet Take 5 mg by mouth nightly. Provider, Historical   FLUoxetine (PROZAC) 20 mg capsule Take 20 mg by mouth daily. Provider, Historical   magnesium oxide (MAG-OX) 400 mg tablet Take 400 mg by mouth daily. Provider, Historical   insulin aspart U-100 (NOVOLOG) 100 unit/mL injection 7 Units by SubCUTAneous route Before breakfast, lunch, and dinner. Provider, Historical   OLANZapine (ZYPREXA) 10 mg tablet Take 10 mg by mouth nightly. Provider, Historical   solifenacin (VESICARE) 10 mg tablet Take 10 mg by mouth daily. Provider, Historical   atorvastatin (LIPITOR) 40 mg tablet Take 40 mg by mouth daily. Provider, Historical   Ferrous Fumarate 325 mg (106 mg iron) tab Take 325 mg by mouth. Provider, Historical   insulin glargine (LANTUS U-100 INSULIN) 100 unit/mL injection 50 Units by SubCUTAneous route nightly. Provider, Historical   lisinopril (PRINIVIL, ZESTRIL) 5 mg tablet Take 5 mg by mouth daily. Provider, Historical   memantine ER (NAMENDA XR) 14 mg capsule Take 14 mg by mouth daily. Provider, Historical   insulin NPH/insulin regular (NOVOLIN 70/30, HUMULIN 70/30) 100 unit/mL (70-30) injection 75 Units by SubCUTAneous route two (2) times a day.     Provider, Historical     Allergies   Allergen Reactions    Propoxyphene N-Acetaminophen Hives    Sulfa (Sulfonamide Antibiotics) Hives        Review of Systems:  A comprehensive review of systems was negative except for that written in the History of Present Illness. No results found for: HBA1C, RDL8MSUA, HGBE8, MLQ7RSNR, [id number]       There is no immunization history on file for this patient. Body mass index is 40.24 kg/m². Counseling regarding nutrition done: No     Current medications:  Current Outpatient Medications   Medication Sig Dispense Refill    esomeprazole (NEXIUM) 40 mg capsule Take 40 mg by mouth daily.  clopidogrel (PLAVIX) 75 mg tab Take 75 mg by mouth daily.  furosemide (LASIX) 40 mg tablet Take 40 mg by mouth daily.  clonazePAM (KLONOPIN) 1 mg tablet Take 0.5 mg by mouth two (2) times a day.  levothyroxine (SYNTHROID) 100 mcg tablet Take 100 mcg by mouth Daily (before breakfast).  venlafaxine (EFFEXOR) 75 mg tablet Take 75 mg by mouth three (3) times daily.  traMADol (ULTRAM) 50 mg tablet Take 50 mg by mouth every six (6) hours as needed for Pain.  aspirin delayed-release 81 mg tablet Take 81 mg by mouth daily.  diclofenac (VOLTAREN) 1 % gel Apply 2 g to affected area four (4) times daily.  donepezil (ARICEPT) 5 mg tablet Take 5 mg by mouth nightly.  FLUoxetine (PROZAC) 20 mg capsule Take 20 mg by mouth daily.  magnesium oxide (MAG-OX) 400 mg tablet Take 400 mg by mouth daily.  insulin aspart U-100 (NOVOLOG) 100 unit/mL injection 7 Units by SubCUTAneous route Before breakfast, lunch, and dinner.  OLANZapine (ZYPREXA) 10 mg tablet Take 10 mg by mouth nightly.  solifenacin (VESICARE) 10 mg tablet Take 10 mg by mouth daily.  atorvastatin (LIPITOR) 40 mg tablet Take 40 mg by mouth daily.  Ferrous Fumarate 325 mg (106 mg iron) tab Take 325 mg by mouth.       insulin glargine (LANTUS U-100 INSULIN) 100 unit/mL injection 50 Units by SubCUTAneous route nightly.  lisinopril (PRINIVIL, ZESTRIL) 5 mg tablet Take 5 mg by mouth daily.  memantine ER (NAMENDA XR) 14 mg capsule Take 14 mg by mouth daily.  insulin NPH/insulin regular (NOVOLIN 70/30, HUMULIN 70/30) 100 unit/mL (70-30) injection 75 Units by SubCUTAneous route two (2) times a day. Objective:     Physical Exam:     Visit Vitals  /71 (BP 1 Location: Right arm, BP Patient Position: At rest;Sitting)   Pulse (!) 43   Resp 20   Ht 5' 2\" (1.575 m)   Wt 99.8 kg (220 lb)   SpO2 99%   BMI 40.24 kg/m²       General: well developed, well nourished, pleasant , NAD. Hygiene good  Psych: cooperative. Pleasant. No anxiety or depression. Normal mood and affect. Neuro: alert and oriented to person/place/situation. Otherwise nonfocal.  Derm: Normal turgor for age, dry skin  HEENT: Normocephalic, atraumatic. EOMI. Conjunctiva clear. No scleral icterus. Neck: Normal range of motion. No masses. Chest: Good air entry bilaterally. Respirations nonlabored  Cardio: Normal heart sounds,no rubs, murmurs or gallops  Abdomen: Soft, nontender, nondistended, normoactive bowel sounds  Lower extremities: color normal; temperature normal. Hair growth is not present. Calves are supple, nontender, approximately equally sized in comparison.  Capillary refill <3 sec      Diabetic Foot Ulcer/Neuropathic   Is Wound Greater than 1.0 sq cm ? : Yes  Re-Current Wound with Skin Substitue within Last Year : No  X-Ray in Last 3 Months: Yes  NIA In Last 6 Months : Yes  Smoking Status: Non- Smoker  Wound Free from Infection : No Culture Done  Is Wound Free of [place], [place] , and / or Bio[place]: No  Malignant Process in Wound : No Biopsy Done  Hemoglobin A1Cin Last 3 Months: Yes  Type of off Loading: Wheelchair if area off surface of wheelchair  Compression Therapy of 20mm or greater ?: No     Ulcer Description:   Wound Heel Right (Active)   Dressing Status Old drainage 1/28/2020  1:35 PM   Non-staged Wound Description Full thickness 1/28/2020  1:35 PM   Wound Length (cm) 1.5 cm 1/28/2020  1:35 PM   Wound Width (cm) 1.2 cm 1/28/2020  1:35 PM   Wound Depth (cm) 0.2 cm 1/28/2020  1:35 PM   Wound Surface Area (cm^2) 1.8 cm^2 1/28/2020  1:35 PM   Wound Volume (cm^3) 0.36 cm^3 1/28/2020  1:35 PM   Condition of Base Granulation;[place] 1/28/2020  1:35 PM   Tissue Type Percent Black 100 % 12/17/2019  1:34 PM   Tissue Type Percent Red 50 1/28/2020  1:35 PM   Tissue Type Percent Yellow 50 1/28/2020  1:35 PM   Drainage Amount Moderate 1/28/2020  1:35 PM   Drainage Color Serosanguinous 1/28/2020  1:35 PM   Wound Odor None 1/28/2020  1:35 PM   Carmencita-wound Assessment Intact 1/28/2020  1:35 PM   Cleansing and Cleansing Agents  Soap and water;Normal saline 1/28/2020  1:35 PM   Dressing Changed Changed/New 1/7/2020  1:12 PM   Procedure Tolerated Well 12/17/2019  1:34 PM   Number of days: 89       [REMOVED] Wound Toe (Comment  which one) Right (Removed)   Dressing Status Clean 12/3/2019  1:18 PM   Non-staged Wound Description Full thickness 10/31/2019  2:34 PM   Wound Length (cm) 0 cm 12/3/2019  1:18 PM   Wound Width (cm) 0 cm 12/3/2019  1:18 PM   Wound Depth (cm) 0 cm 12/3/2019  1:18 PM   Wound Surface Area (cm^2) 0 cm^2 12/3/2019  1:18 PM   Wound Volume (cm^3) 0 cm^3 12/3/2019  1:18 PM   Condition of Base Epithelializing 11/12/2019  1:20 PM   Epithelialization (%) 100 11/12/2019  1:20 PM   Tissue Type Percent Black 100 % 10/31/2019  2:34 PM   Drainage Amount None 12/3/2019  1:18 PM   Drainage Color Serosanguinous 10/31/2019  2:34 PM   Wound Odor None 12/3/2019  1:18 PM   Carmencita-wound Assessment Dry 10/31/2019  2:34 PM   Cleansing and Cleansing Agents  Normal saline 12/3/2019  1:18 PM   Dressing Changed Changed/New 10/31/2019  2:34 PM   Number of days: 47         Data Review:   No results found for this or any previous visit (from the past 24 hour(s)). Assessment:     67 y.o. female with R heel combined ulcer.          Plan:     Orders Placed This Encounter    WOUND CARE, DRESSING CHANGE     Right Heel Wound:  Cleanse wound with normal saline. DO NOT GET WET IN SHOWER, WEAR CAST COVER IN SHOWER! Apply Betadine to right heel cover with rolled gauze. Change daily. Elevate heels off of bed, do not allow pressure to be put on heels.      Betadine to right great toe.  [place] daily. **[facility] facility- Please purchase heel cushions for offloading heel. May purchase online at [email or web address]. Standing Status:   Standing     Number of Occurrences:   1        Patient understood and agrees with plan. Questions answered. Follow-up Information     Follow up With Specialties Details Why Contact Info    [address] In 3 weeks  [facility] [address] [place] [id number]  [phone]             Any procedures done today in the [address] [facility] are documented in a separate note in [facility] and made part of this record by reference.      Dictated using voice recognition software; proofread, but unrecognized errors may exist.    Signed By: Christianne Gipson MD     January 28, 2020

## 2020-02-18 ENCOUNTER — HOSPITAL ENCOUNTER (OUTPATIENT)
Dept: WOUND CARE | Age: 80
Discharge: HOME | End: 2020-02-18
Attending: SURGERY
Payer: MEDICARE

## 2020-02-18 VITALS
RESPIRATION RATE: 16 BRPM | WEIGHT: 200 LBS | SYSTOLIC BLOOD PRESSURE: 132 MMHG | HEART RATE: 46 BPM | HEIGHT: 62 IN | TEMPERATURE: 98.3 F | BODY MASS INDEX: 36.8 KG/M2 | DIASTOLIC BLOOD PRESSURE: 55 MMHG

## 2020-02-18 PROCEDURE — 99212 OFFICE O/P EST SF 10 MIN: CPT

## 2020-02-18 RX ORDER — INSULIN ASPART 100 [IU]/ML
10 INJECTION, SOLUTION INTRAVENOUS; SUBCUTANEOUS
COMMUNITY

## 2020-02-18 RX ORDER — MELATONIN
2000 DAILY
COMMUNITY

## 2020-02-18 NOTE — WOUND CARE
[address] [place] [address], [address] Phone: [phone] Fax: [phone] Patient: Aron Galeas MRN: [id number]  SSN: [id number] YOB: 1940  Age: 67 y.o. Sex: female Return Appointment: 3 weeks with Feliciano Blood MD 
 
Instructions: Right Heel Wound: 
Cleanse wound with normal saline. DO NOT GET WET IN SHOWER, WEAR CAST COVER IN SHOWER! Apply Betadine to right heel cover with rolled gauze. Change daily. Elevate heels off of bed, do not allow pressure to be put on heels. Wear foam boot at night to offload heel. Should you experience increased redness, swelling, pain, foul odor, size of wound(s), or have a temperature over 101 degrees please contact the [address] [place] [address] Road at [phone] or if after hours contact your primary care physician or go to the hospital emergency department. Signed By: Mattie Avery February 18, 2020

## 2020-02-18 NOTE — WOUND CARE
02/18/20 1021 Wound Heel Right Date First Assessed/Time First Assessed: 10/31/19 1434   Present on Hospital Admission: Yes  Primary Wound Type: Diabetic Ulcer  Location: Heel  Wound Location Orientation: Right Dressing Status Clean, dry, and intact Dressing Type  
(betadine, gauze) Non-staged Wound Description Full thickness Wound Length (cm) 1 cm Wound Width (cm) 0.8 cm Wound Depth (cm) 0.1 cm Wound Surface Area (cm^2) 0.8 cm^2 Wound Volume (cm^3) 0.08 cm^3 Condition of Base Granulation Tissue Type Percent Red 95 Tissue Type Percent Yellow 5 Drainage Amount Small Drainage Color Serosanguinous Wound Odor None Carmencita-wound Assessment Intact Cleansing and Cleansing Agents  Soap and water Dressing Changed Changed/New Patient is on an anti coagulant daily ASA81 and plavix.

## 2020-02-18 NOTE — PROGRESS NOTES
Right heel wound much smaller and almost completely healed continue applying Betadine dressing and offloading with a convoluted boot. Return in 2 weeks               Wound Center Progress Note    Patient: Yadi Lyons MRN: [id number]  SSN: [id number]    YOB: 1940  Age: 67 y.o. Sex: female      Subjective:     Chief Complaint:  Yadi Lyons is a 67 y.o. WHITE OR  female who presents with R heel wound of 3 months duration. History of Present Illness:       Wound Caused By: previous infection  Associated Signs and Symptoms: Pressure  Timing: Intermittent  Quality: Burning  Severity: 2/10  Modifying Factors: Inability to offload without assistance  Current Wound Care: See nurses notes    Past Medical History:   Diagnosis Date    Arthritis     [facility]     Chronic pain     [facility] ([facility]F) [facility]     Diabetes ([facility]     GERD (gastroesophageal reflux disease)     Hypertension     Obstructive sleep apnea     Pulmonary hypertension [facility]     Stroke ([facility]     Thyroid disease     Hypothyroidism      Past Surgical History:   Procedure Laterality Date    HX BILATERAL SALPINGO-OOPHORECTOMY      HX CHOLECYSTECTOMY      HX GYN      total abdominal hysterectomy     History reviewed. No pertinent family history. Social History     Tobacco Use    Smoking status: Former Smoker     Packs/day: 2.00     Years: 4.00     Pack years: 8.00     Last attempt to quit:      Years since quittin.1    Smokeless tobacco: Never Used   Substance Use Topics    Alcohol use: Not Currently       Prior to Admission medications    Medication Sig Start Date End Date Taking? Authorizing Provider   esomeprazole (NEXIUM) 40 mg capsule Take 40 mg by mouth daily. Provider, Historical   clopidogrel (PLAVIX) 75 mg tab Take 75 mg by mouth daily. Provider, Historical   furosemide (LASIX) 40 mg tablet Take 40 mg by mouth daily.     Provider, Historical   clonazePAM (KLONOPIN) 1 mg tablet Take 0.5 mg by mouth two (2) times a day. Provider, Historical   levothyroxine (SYNTHROID) 100 mcg tablet Take 100 mcg by mouth Daily (before breakfast). Provider, Historical   venlafaxine (EFFEXOR) 75 mg tablet Take 75 mg by mouth three (3) times daily. Provider, Historical   traMADol (ULTRAM) 50 mg tablet Take 50 mg by mouth every six (6) hours as needed for Pain. Provider, Historical   aspirin delayed-release 81 mg tablet Take 81 mg by mouth daily. Provider, Historical   diclofenac (VOLTAREN) 1 % gel Apply 2 g to affected area four (4) times daily. Provider, Historical   donepezil (ARICEPT) 5 mg tablet Take 5 mg by mouth nightly. Provider, Historical   FLUoxetine (PROZAC) 20 mg capsule Take 20 mg by mouth daily. Provider, Historical   magnesium oxide (MAG-OX) 400 mg tablet Take 400 mg by mouth daily. Provider, Historical   insulin aspart U-100 (NOVOLOG) 100 unit/mL injection 7 Units by SubCUTAneous route Before breakfast, lunch, and dinner. Provider, Historical   OLANZapine (ZYPREXA) 10 mg tablet Take 10 mg by mouth nightly. Provider, Historical   solifenacin (VESICARE) 10 mg tablet Take 10 mg by mouth daily. Provider, Historical   atorvastatin (LIPITOR) 40 mg tablet Take 40 mg by mouth daily. Provider, Historical   Ferrous Fumarate 325 mg (106 mg iron) tab Take 325 mg by mouth. Provider, Historical   insulin glargine (LANTUS U-100 INSULIN) 100 unit/mL injection 50 Units by SubCUTAneous route nightly. Provider, Historical   lisinopril (PRINIVIL, ZESTRIL) 5 mg tablet Take 5 mg by mouth daily. Provider, Historical   memantine ER (NAMENDA XR) 14 mg capsule Take 14 mg by mouth daily. Provider, Historical   insulin NPH/insulin regular (NOVOLIN 70/30, HUMULIN 70/30) 100 unit/mL (70-30) injection 75 Units by SubCUTAneous route two (2) times a day.     Provider, Historical     Allergies   Allergen Reactions    Propoxyphene N-Acetaminophen Hives    Sulfa (Sulfonamide Antibiotics) Hives        Review of Systems:  A comprehensive review of systems was negative except for that written in the History of Present Illness. No results found for: HBA1C, [id number], HGBE8, [id number], [id number]       There is no immunization history on file for this patient. There is no height or weight on file to calculate BMI. Counseling regarding nutrition done: No     Current medications:  Current Outpatient Medications   Medication Sig Dispense Refill    esomeprazole (NEXIUM) 40 mg capsule Take 40 mg by mouth daily.  clopidogrel (PLAVIX) 75 mg tab Take 75 mg by mouth daily.  furosemide (LASIX) 40 mg tablet Take 40 mg by mouth daily.  clonazePAM (KLONOPIN) 1 mg tablet Take 0.5 mg by mouth two (2) times a day.  levothyroxine (SYNTHROID) 100 mcg tablet Take 100 mcg by mouth Daily (before breakfast).  venlafaxine (EFFEXOR) 75 mg tablet Take 75 mg by mouth three (3) times daily.  traMADol (ULTRAM) 50 mg tablet Take 50 mg by mouth every six (6) hours as needed for Pain.  aspirin delayed-release 81 mg tablet Take 81 mg by mouth daily.  diclofenac (VOLTAREN) 1 % gel Apply 2 g to affected area four (4) times daily.  donepezil (ARICEPT) 5 mg tablet Take 5 mg by mouth nightly.  FLUoxetine (PROZAC) 20 mg capsule Take 20 mg by mouth daily.  magnesium oxide (MAG-OX) 400 mg tablet Take 400 mg by mouth daily.  insulin aspart U-100 (NOVOLOG) 100 unit/mL injection 7 Units by SubCUTAneous route Before breakfast, lunch, and dinner.  OLANZapine (ZYPREXA) 10 mg tablet Take 10 mg by mouth nightly.  solifenacin (VESICARE) 10 mg tablet Take 10 mg by mouth daily.  atorvastatin (LIPITOR) 40 mg tablet Take 40 mg by mouth daily.  Ferrous Fumarate 325 mg (106 mg iron) tab Take 325 mg by mouth.  insulin glargine (LANTUS U-100 INSULIN) 100 unit/mL injection 50 Units by SubCUTAneous route nightly.       lisinopril (PRINIVIL, ZESTRIL) 5 mg tablet Take 5 mg by mouth daily.  memantine ER (NAMENDA XR) 14 mg capsule Take 14 mg by mouth daily.  insulin NPH/insulin regular (NOVOLIN 70/30, HUMULIN 70/30) 100 unit/mL (70-30) injection 75 Units by SubCUTAneous route two (2) times a day. Objective:     Physical Exam:     Visit Vitals  /55 (BP 1 Location: Right arm, BP Patient Position: Sitting)   Pulse (!) 46   Temp 98.3 °F (36.8 °C)   Resp 16       General: well developed, well nourished, pleasant , NAD. Hygiene good  Psych: cooperative. Pleasant. No anxiety or depression. Normal mood and affect. Neuro: alert and oriented to person/place/situation. Otherwise nonfocal.  Derm: Normal turgor for age, dry skin  HEENT: Normocephalic, atraumatic. EOMI. Conjunctiva clear. No scleral icterus. Neck: Normal range of motion. No masses. Chest: Good air entry bilaterally. Respirations nonlabored  Cardio: Normal heart sounds,no rubs, murmurs or gallops  Abdomen: Soft, nontender, nondistended, normoactive bowel sounds  Lower extremities: color normal; temperature normal. Hair growth is not present. Calves are supple, nontender, approximately equally sized in comparison.  Capillary refill <3 sec      Diabetic Foot Ulcer/Neuropathic   Is Wound Greater than 1.0 sq cm ? : No  Re-Current Wound with Skin Substitue within Last Year : No  X-Ray in Last 3 Months: Yes  NIA In Last 6 Months : Yes  Smoking Status: Non- Smoker  Wound Free from Infection : No Culture Done  Is Wound Free of [address], [place] , and / or Bio[place]: Yes  Malignant Process in Wound : No Biopsy Done  Hemoglobin A1Cin Last 3 Months: Yes  Type of off Loading: Wheelchair if area off surface of wheelchair  Compression Therapy of 20mm or greater ?: No     Ulcer Description:   Wound Heel Right (Active)   Dressing Status Clean, dry, and intact 2/18/2020 10:21 AM   Non-staged Wound Description Full thickness 2/18/2020 10:21 AM   Wound Length (cm) 1 cm 2/18/2020 10:21 AM   Wound Width (cm) 0.8 cm 2/18/2020 10:21 AM   Wound Depth (cm) 0.1 cm 2/18/2020 10:21 AM   Wound Surface Area (cm^2) 0.8 cm^2 2/18/2020 10:21 AM   Wound Volume (cm^3) 0.08 cm^3 2/18/2020 10:21 AM   Condition of Base Granulation 2/18/2020 10:21 AM   Tissue Type Percent Black 100 % 12/17/2019  1:34 PM   Tissue Type Percent Red 95 2/18/2020 10:21 AM   Tissue Type Percent Yellow 5 2/18/2020 10:21 AM   Drainage Amount Small 2/18/2020 10:21 AM   Drainage Color Serosanguinous 2/18/2020 10:21 AM   Wound Odor None 2/18/2020 10:21 AM   Carmencita-wound Assessment Intact 2/18/2020 10:21 AM   Cleansing and Cleansing Agents  Soap and water 2/18/2020 10:21 AM   Dressing Changed Changed/New 2/18/2020 10:21 AM   Procedure Tolerated Well 1/28/2020  1:35 PM   Number of days: 110       [REMOVED] Wound Toe (Comment  which one) Right (Removed)   Dressing Status Clean 12/3/2019  1:18 PM   Non-staged Wound Description Full thickness 10/31/2019  2:34 PM   Wound Length (cm) 0 cm 12/3/2019  1:18 PM   Wound Width (cm) 0 cm 12/3/2019  1:18 PM   Wound Depth (cm) 0 cm 12/3/2019  1:18 PM   Wound Surface Area (cm^2) 0 cm^2 12/3/2019  1:18 PM   Wound Volume (cm^3) 0 cm^3 12/3/2019  1:18 PM   Condition of Base Epithelializing 11/12/2019  1:20 PM   Epithelialization (%) 100 11/12/2019  1:20 PM   Tissue Type Percent Black 100 % 10/31/2019  2:34 PM   Drainage Amount None 12/3/2019  1:18 PM   Drainage Color Serosanguinous 10/31/2019  2:34 PM   Wound Odor None 12/3/2019  1:18 PM   Carmencita-wound Assessment Dry 10/31/2019  2:34 PM   Cleansing and Cleansing Agents  Normal saline 12/3/2019  1:18 PM   Dressing Changed Changed/New 10/31/2019  2:34 PM   Number of days: 47         Data Review:   No results found for this or any previous visit (from the past 24 hour(s)). Assessment:     67 y.o. female with R heel combined ulcer. Plan:     Orders Placed This Encounter    WOUND CARE, DRESSING CHANGE     Right Heel Wound:  Cleanse wound with normal saline.   DO NOT GET WET IN [place], WEAR CAST COVER IN [place]! Apply Betadine to right heel cover with rolled gauze. Change daily. Elevate heels off of bed, do not allow pressure to be put on heels.         Standing Status:   Standing     Number of Occurrences:   1        Patient understood and agrees with plan. Questions answered. Follow-up Information    None            Any procedures done today in the [address] [facility] are documented in a separate note in [facility] and made part of this record by reference.      Dictated using voice recognition software; proofread, but unrecognized errors may exist.    Signed By: Asha Floyd MD     February 18, 2020

## 2020-03-10 ENCOUNTER — HOSPITAL ENCOUNTER (OUTPATIENT)
Dept: WOUND CARE | Age: 80
Discharge: HOME | End: 2020-03-10
Attending: SURGERY
Payer: MEDICARE

## 2020-03-10 VITALS
DIASTOLIC BLOOD PRESSURE: 63 MMHG | WEIGHT: 200 LBS | HEIGHT: 62 IN | HEART RATE: 40 BPM | TEMPERATURE: 97.9 F | SYSTOLIC BLOOD PRESSURE: 131 MMHG | BODY MASS INDEX: 36.8 KG/M2 | OXYGEN SATURATION: 99 %

## 2020-03-10 PROCEDURE — 99212 OFFICE O/P EST SF 10 MIN: CPT

## 2020-03-10 NOTE — DISCHARGE INSTRUCTIONS
[address]  [address], [address]   Phone: [phone]  Fax: [phone]    Patient: Jose Luis Canada MRN: [id number]  SSN: [id number]    YOB: 1940  Age: 67 y.o. Sex: female       Return Appointment: 3 weeks with Cas Daily MD    Instructions: Right Heel Wound:  Cleanse wound with normal saline. DO NOT GET WET IN SHOWER, WEAR CAST COVER IN SHOWER! Apply Betadine to right heel cover with rolled gauze. Change daily. Elevate heels off of bed, do not allow pressure to be put on heels.      Wear foam boot at night to offload heel. Should you experience increased redness, swelling, pain, foul odor, size of wound(s), or have a temperature over 101 degrees please contact the [address] [place] [address] Road at [phone] or if after hours contact your primary care physician or go to the hospital emergency department.     Signed By: Luis Enrique Blackwell RN     March 10, 2020

## 2020-03-10 NOTE — PROGRESS NOTES
Ms. Elyse Sarmiento heel ulcer is still covered by hard eschar and we will continue to paint it with Betadine and keep her from getting pressure on it by wearing boots in the bed. She will be seen again in 3 weeks               Wound Center Progress Note    Patient: Ry Marina MRN: [id number]  SSN: [id number]    YOB: 1940  Age: 67 y.o. Sex: female      Subjective:     Chief Complaint:  Ry Marina is a 67 y.o. WHITE [place]  female who presents with R heel wound of 2 months duration. History of Present Illness:     See above dictation  Wound Caused By: Pressure  Associated Signs and Symptoms: Mild pain  Timing: Intermittent  Quality: Aching  Severity: 2/10  Modifying Factors: Bedridden state  Current Wound Care: See nurses notes    Past Medical History:   Diagnosis Date    Arthritis     Cerebrovascular accident (CVA) ([address]     Chronic pain     [facility]     Diabetes [facility]     GERD (gastroesophageal reflux disease)     Hypertension     Obstructive sleep apnea     Pulmonary hypertension [facility]     Stroke [facility]     Thyroid disease     Hypothyroidism      Past Surgical History:   Procedure Laterality Date    HX BILATERAL SALPINGO-OOPHORECTOMY      HX CHOLECYSTECTOMY      HX GYN      total abdominal hysterectomy     History reviewed. No pertinent family history. Social History     Tobacco Use    Smoking status: Former Smoker     Packs/day: 2.00     Years: 4.00     Pack years: 8.00     Last attempt to quit:      Years since quittin.2    Smokeless tobacco: Never Used   Substance Use Topics    Alcohol use: Not Currently       Prior to Admission medications    Medication Sig Start Date End Date Taking? Authorizing Provider   insulin aspart U-100 (NOVOLOG U-100 INSULIN ASPART) 100 unit/mL injection 10 Units by SubCUTAneous route Before breakfast, lunch, and dinner. Yes Provider, Historical   esomeprazole (NEXIUM) 40 mg capsule Take 40 mg by mouth daily. Yes Provider, Historical   clopidogrel (PLAVIX) 75 mg tab Take 75 mg by mouth daily. Yes Provider, Historical   furosemide (LASIX) 40 mg tablet Take 40 mg by mouth two (2) times a day. Yes Provider, Historical   clonazePAM (KLONOPIN) 1 mg tablet Take  by mouth daily. Yes Provider, Historical   levothyroxine (SYNTHROID) 100 mcg tablet Take 125 mcg by mouth Daily (before breakfast). Yes Provider, Historical   traMADol (ULTRAM) 50 mg tablet Take 50 mg by mouth every six (6) hours as needed for Pain. Yes Provider, Historical   aspirin delayed-release 81 mg tablet Take 81 mg by mouth daily. Yes Provider, Historical   diclofenac (VOLTAREN) 1 % gel Apply 2 g to affected area four (4) times daily. Yes Provider, Historical   donepezil (ARICEPT) 5 mg tablet Take 5 mg by mouth nightly. Yes Provider, Historical   FLUoxetine (PROZAC) 20 mg capsule Take 20 mg by mouth daily. Yes Provider, Historical   magnesium oxide (MAG-OX) 400 mg tablet Take 400 mg by mouth daily. Yes Provider, Historical   OLANZapine (ZYPREXA) 10 mg tablet Take 10 mg by mouth nightly. Yes Provider, Historical   solifenacin (VESICARE) 10 mg tablet Take 10 mg by mouth daily. Yes Provider, Historical   atorvastatin (LIPITOR) 40 mg tablet Take 40 mg by mouth daily. Yes Provider, Historical   Ferrous Fumarate 325 mg (106 mg iron) tab Take 325 mg by mouth. Yes Provider, Historical   insulin glargine (LANTUS U-100 INSULIN) 100 unit/mL injection 55 Units by SubCUTAneous route nightly. Indications: 55 units in am and 10 units in pm   Yes Provider, Historical   lisinopril (PRINIVIL, ZESTRIL) 5 mg tablet Take 5 mg by mouth daily. Yes Provider, Historical   memantine ER (NAMENDA XR) 14 mg capsule Take 14 mg by mouth daily. Yes Provider, Historical   insulin NPH/insulin regular (NOVOLIN 70/30, HUMULIN 70/30) 100 unit/mL (70-30) injection 75 Units by SubCUTAneous route two (2) times a day.    Yes Provider, Historical   cholecalciferol (VITAMIN D3) (1000 Units /25 mcg) tablet Take 2,000 Units by mouth daily. Provider, Historical     Allergies   Allergen Reactions    Propoxyphene N-Acetaminophen Hives    Sulfa (Sulfonamide Antibiotics) Hives        Review of Systems:  A comprehensive review of systems was negative except for that written in the History of Present Illness. No results found for: HBA1C, BDD6OHVF, HGBE8, PKP6MHMG, OHY4TWOG       There is no immunization history on file for this patient. Body mass index is 36.58 kg/m². Counseling regarding nutrition done: No     Current medications:  Current Outpatient Medications   Medication Sig Dispense Refill    insulin aspart U-100 (NOVOLOG U-100 INSULIN ASPART) 100 unit/mL injection 10 Units by SubCUTAneous route Before breakfast, lunch, and dinner.  esomeprazole (NEXIUM) 40 mg capsule Take 40 mg by mouth daily.  clopidogrel (PLAVIX) 75 mg tab Take 75 mg by mouth daily.  furosemide (LASIX) 40 mg tablet Take 40 mg by mouth two (2) times a day.  clonazePAM (KLONOPIN) 1 mg tablet Take  by mouth daily.  levothyroxine (SYNTHROID) 100 mcg tablet Take 125 mcg by mouth Daily (before breakfast).  traMADol (ULTRAM) 50 mg tablet Take 50 mg by mouth every six (6) hours as needed for Pain.  aspirin delayed-release 81 mg tablet Take 81 mg by mouth daily.  diclofenac (VOLTAREN) 1 % gel Apply 2 g to affected area four (4) times daily.  donepezil (ARICEPT) 5 mg tablet Take 5 mg by mouth nightly.  FLUoxetine (PROZAC) 20 mg capsule Take 20 mg by mouth daily.  magnesium oxide (MAG-OX) 400 mg tablet Take 400 mg by mouth daily.  OLANZapine (ZYPREXA) 10 mg tablet Take 10 mg by mouth nightly.  solifenacin (VESICARE) 10 mg tablet Take 10 mg by mouth daily.  atorvastatin (LIPITOR) 40 mg tablet Take 40 mg by mouth daily.  Ferrous Fumarate 325 mg (106 mg iron) tab Take 325 mg by mouth.       insulin glargine (LANTUS U-100 INSULIN) 100 unit/mL injection 55 Units by SubCUTAneous route nightly. Indications: 55 units in am and 10 units in pm      lisinopril (PRINIVIL, ZESTRIL) 5 mg tablet Take 5 mg by mouth daily.  memantine ER (NAMENDA XR) 14 mg capsule Take 14 mg by mouth daily.  insulin NPH/insulin regular (NOVOLIN 70/30, HUMULIN 70/30) 100 unit/mL (70-30) injection 75 Units by SubCUTAneous route two (2) times a day.  cholecalciferol (VITAMIN D3) (1000 Units /25 mcg) tablet Take 2,000 Units by mouth daily. Objective:     Physical Exam:     Visit Vitals  /63   Pulse (!) 40   Temp 97.9 °F (36.6 °C)   Ht 5' 2\" (1.575 m)   Wt 90.7 kg (200 lb)   SpO2 99%   BMI 36.58 kg/m²       General: well developed, well nourished, pleasant , NAD. Hygiene good  Psych: cooperative. Pleasant. No anxiety or depression. Normal mood and affect. Neuro: alert and oriented to person/place/situation. Otherwise nonfocal.  Derm: Normal turgor for age, dry skin  HEENT: Normocephalic, atraumatic. EOMI. Conjunctiva clear. No scleral icterus. Neck: Normal range of motion. No masses. Chest: Good air entry bilaterally. Respirations nonlabored  Cardio: Normal heart sounds,no rubs, murmurs or gallops  Abdomen: Soft, nontender, nondistended, normoactive bowel sounds  Lower extremities: color normal; temperature normal. Hair growth is not present. Calves are supple, nontender, approximately equally sized in comparison.  Capillary refill <3 sec            Ulcer Description:   Wound Heel Right (Active)   Dressing Status Clean, dry, and intact 3/10/2020 10:19 AM   Non-staged Wound Description Full thickness 3/10/2020 10:19 AM   Wound Length (cm) 1 cm 3/10/2020 10:19 AM   Wound Width (cm) 0.7 cm 3/10/2020 10:19 AM   Wound Depth (cm) 0.1 cm 3/10/2020 10:19 AM   Wound Surface Area (cm^2) 0.7 cm^2 3/10/2020 10:19 AM   Wound Volume (cm^3) 0.07 cm^3 3/10/2020 10:19 AM   Change in Wound Size % 94.17 3/10/2020 10:19 AM   Condition of Base Granulation;Slough 3/10/2020 10:19 AM   Condition of Edges Open 3/10/2020 10:19 AM   Tissue Type Percent Black 100 % 12/17/2019  1:34 PM   Tissue Type Percent Red 95 3/10/2020 10:19 AM   Tissue Type Percent Yellow 5 3/10/2020 10:19 AM   Drainage Amount Small 3/10/2020 10:19 AM   Drainage Color Serosanguinous 3/10/2020 10:19 AM   Wound Odor None 3/10/2020 10:19 AM   Carmencita-wound Assessment Intact 3/10/2020 10:19 AM   Cleansing and Cleansing Agents  Normal saline 3/10/2020 10:19 AM   Dressing Changed Changed/New 3/10/2020 10:19 AM   Procedure Tolerated Well 2/18/2020 10:21 AM   Number of days: 131       [REMOVED] Wound Toe (Comment  which one) Right (Removed)   Dressing Status Clean 12/3/2019  1:18 PM   Non-staged Wound Description Full thickness 10/31/2019  2:34 PM   Wound Length (cm) 0 cm 12/3/2019  1:18 PM   Wound Width (cm) 0 cm 12/3/2019  1:18 PM   Wound Depth (cm) 0 cm 12/3/2019  1:18 PM   Wound Surface Area (cm^2) 0 cm^2 12/3/2019  1:18 PM   Wound Volume (cm^3) 0 cm^3 12/3/2019  1:18 PM   Condition of Base Epithelializing 11/12/2019  1:20 PM   Epithelialization (%) 100 11/12/2019  1:20 PM   Tissue Type Percent Black 100 % 10/31/2019  2:34 PM   Drainage Amount None 12/3/2019  1:18 PM   Drainage Color Serosanguinous 10/31/2019  2:34 PM   Wound Odor None 12/3/2019  1:18 PM   Carmencita-wound Assessment Dry 10/31/2019  2:34 PM   Cleansing and Cleansing Agents  Normal saline 12/3/2019  1:18 PM   Dressing Changed Changed/New 10/31/2019  2:34 PM   Number of days: 47         Data Review:   No results found for this or any previous visit (from the past 24 hour(s)). Assessment:     67 y.o. female with R heel pressure ulcer. Plan:     Orders Placed This Encounter    WOUND CARE, DRESSING CHANGE     Right Heel Wound:  Cleanse wound with normal saline. DO NOT GET WET IN SHOWER, WEAR CAST COVER IN SHOWER! Apply Betadine to right heel cover with rolled gauze. Change daily.    Elevate heels off of bed, do not allow pressure to be put on heels.      Wear foam boot at night to offload heel.        Standing Status:   Standing     Number of Occurrences:   1        Patient understood and agrees with plan. Questions answered. Follow-up Information     Follow up With Specialties Details Why Contact Info    [address] In 3 weeks  [place] [address] [place] [address] [id number] [phone]             Any procedures done today in the [facility],[address] are documented in a separate note in [facility] and made part of this record by reference.      Dictated using voice recognition software; proofread, but unrecognized errors may exist.    Signed By: Cara Hall MD     March 10, 2020

## 2020-03-10 NOTE — WOUND CARE
[address] [place] [address] [address] Phone: [phone] Fax: [phone] Patient: Birgit Ramírez MRN: [id number]  SSN: [id number] YOB: 1940  Age: 67 y.o. Sex: female Return Appointment: 3 weeks with Eladio Walker MD 
 
Instructions: Right Heel Wound: 
Cleanse wound with normal saline. DO NOT GET WET IN SHOWER, WEAR CAST COVER IN SHOWER! Apply Betadine to right heel cover with rolled gauze. Change daily. Elevate heels off of bed, do not allow pressure to be put on heels.  
  
Wear foam boot at night to offload heel. Should you experience increased redness, swelling, pain, foul odor, size of wound(s), or have a temperature over 101 degrees please contact the [address] [place] [address] Road at [phone] or if after hours contact your primary care physician or go to the hospital emergency department. Signed By: Can Merrill RN   
 March 10, 2020

## 2020-03-10 NOTE — PROGRESS NOTES
03/10/20 1019   Wound Heel Right   Date First Assessed/Time First Assessed: 10/31/19 1434   Present on Hospital Admission: Yes  Primary Wound Type: Diabetic Ulcer  Location: Heel  Wound Location Orientation: Right   Dressing Status Clean, dry, and intact   Dressing Type   (betadine and rolled gauze)   Non-staged Wound Description Full thickness   Wound Length (cm) 1 cm   Wound Width (cm) 0.7 cm   Wound Depth (cm) 0.1 cm   Wound Surface Area (cm^2) 0.7 cm^2   Wound Volume (cm^3) 0.07 cm^3   Change in Wound Size % 94.17   Condition of Base Granulation;Slough   Condition of Edges Open   Tissue Type Percent Red 95   Tissue Type Percent Yellow 5   Drainage Amount Small   Drainage Color Serosanguinous   Wound Odor None   Carmencita-wound Assessment Intact   Cleansing and Cleansing Agents  Normal saline   Dressing Changed Changed/New       Patient is taking asprin 81 mg daily and plavix